# Patient Record
Sex: FEMALE | Race: WHITE | NOT HISPANIC OR LATINO
[De-identification: names, ages, dates, MRNs, and addresses within clinical notes are randomized per-mention and may not be internally consistent; named-entity substitution may affect disease eponyms.]

---

## 2017-01-23 ENCOUNTER — APPOINTMENT (OUTPATIENT)
Dept: GASTROENTEROLOGY | Facility: CLINIC | Age: 77
End: 2017-01-23

## 2017-03-02 ENCOUNTER — APPOINTMENT (OUTPATIENT)
Dept: GASTROENTEROLOGY | Facility: CLINIC | Age: 77
End: 2017-03-02

## 2017-03-02 VITALS
BODY MASS INDEX: 22.53 KG/M2 | OXYGEN SATURATION: 97 % | DIASTOLIC BLOOD PRESSURE: 60 MMHG | HEIGHT: 64 IN | RESPIRATION RATE: 14 BRPM | SYSTOLIC BLOOD PRESSURE: 120 MMHG | TEMPERATURE: 97.7 F | HEART RATE: 68 BPM | WEIGHT: 132 LBS

## 2017-03-02 DIAGNOSIS — E78.00 PURE HYPERCHOLESTEROLEMIA, UNSPECIFIED: ICD-10-CM

## 2017-03-02 DIAGNOSIS — Z83.79 FAMILY HISTORY OF OTHER DISEASES OF THE DIGESTIVE SYSTEM: ICD-10-CM

## 2017-03-02 DIAGNOSIS — K62.5 HEMORRHAGE OF ANUS AND RECTUM: ICD-10-CM

## 2017-03-03 ENCOUNTER — OTHER (OUTPATIENT)
Age: 77
End: 2017-03-03

## 2017-03-05 ENCOUNTER — FORM ENCOUNTER (OUTPATIENT)
Age: 77
End: 2017-03-05

## 2017-03-06 ENCOUNTER — APPOINTMENT (OUTPATIENT)
Dept: CT IMAGING | Facility: IMAGING CENTER | Age: 77
End: 2017-03-06

## 2017-03-06 ENCOUNTER — OUTPATIENT (OUTPATIENT)
Dept: OUTPATIENT SERVICES | Facility: HOSPITAL | Age: 77
LOS: 1 days | End: 2017-03-06
Payer: MEDICARE

## 2017-03-06 DIAGNOSIS — R10.32 LEFT LOWER QUADRANT PAIN: ICD-10-CM

## 2017-03-06 PROCEDURE — 82565 ASSAY OF CREATININE: CPT

## 2017-03-06 PROCEDURE — 74177 CT ABD & PELVIS W/CONTRAST: CPT

## 2017-03-29 ENCOUNTER — RESULT REVIEW (OUTPATIENT)
Age: 77
End: 2017-03-29

## 2017-05-02 ENCOUNTER — MESSAGE (OUTPATIENT)
Age: 77
End: 2017-05-02

## 2017-05-08 ENCOUNTER — APPOINTMENT (OUTPATIENT)
Dept: GASTROENTEROLOGY | Facility: CLINIC | Age: 77
End: 2017-05-08

## 2017-05-15 ENCOUNTER — APPOINTMENT (OUTPATIENT)
Dept: GASTROENTEROLOGY | Facility: CLINIC | Age: 77
End: 2017-05-15

## 2017-05-15 VITALS
DIASTOLIC BLOOD PRESSURE: 70 MMHG | OXYGEN SATURATION: 95 % | HEART RATE: 70 BPM | SYSTOLIC BLOOD PRESSURE: 110 MMHG | TEMPERATURE: 98 F | HEIGHT: 64 IN | BODY MASS INDEX: 22.71 KG/M2 | RESPIRATION RATE: 14 BRPM | WEIGHT: 133 LBS

## 2017-06-23 ENCOUNTER — APPOINTMENT (OUTPATIENT)
Dept: GASTROENTEROLOGY | Facility: CLINIC | Age: 77
End: 2017-06-23

## 2017-07-03 ENCOUNTER — APPOINTMENT (OUTPATIENT)
Dept: ORTHOPEDIC SURGERY | Facility: CLINIC | Age: 77
End: 2017-07-03

## 2017-07-03 VITALS
SYSTOLIC BLOOD PRESSURE: 110 MMHG | HEIGHT: 64 IN | HEART RATE: 72 BPM | BODY MASS INDEX: 22.71 KG/M2 | DIASTOLIC BLOOD PRESSURE: 80 MMHG | WEIGHT: 133 LBS

## 2017-07-03 DIAGNOSIS — M25.572 PAIN IN RIGHT ANKLE AND JOINTS OF RIGHT FOOT: ICD-10-CM

## 2017-07-03 DIAGNOSIS — M25.571 PAIN IN RIGHT ANKLE AND JOINTS OF RIGHT FOOT: ICD-10-CM

## 2017-07-18 ENCOUNTER — APPOINTMENT (OUTPATIENT)
Dept: MRI IMAGING | Facility: IMAGING CENTER | Age: 77
End: 2017-07-18

## 2017-07-18 ENCOUNTER — OUTPATIENT (OUTPATIENT)
Dept: OUTPATIENT SERVICES | Facility: HOSPITAL | Age: 77
LOS: 1 days | End: 2017-07-18
Payer: MEDICARE

## 2017-07-18 DIAGNOSIS — M25.572 PAIN IN LEFT ANKLE AND JOINTS OF LEFT FOOT: ICD-10-CM

## 2017-07-18 PROCEDURE — 73721 MRI JNT OF LWR EXTRE W/O DYE: CPT

## 2017-08-03 ENCOUNTER — FORM ENCOUNTER (OUTPATIENT)
Age: 77
End: 2017-08-03

## 2017-08-04 ENCOUNTER — APPOINTMENT (OUTPATIENT)
Dept: NUCLEAR MEDICINE | Facility: HOSPITAL | Age: 77
End: 2017-08-04
Payer: MEDICARE

## 2017-08-04 ENCOUNTER — OUTPATIENT (OUTPATIENT)
Dept: OUTPATIENT SERVICES | Facility: HOSPITAL | Age: 77
LOS: 1 days | End: 2017-08-04

## 2017-08-04 DIAGNOSIS — K21.9 GASTRO-ESOPHAGEAL REFLUX DISEASE WITHOUT ESOPHAGITIS: ICD-10-CM

## 2017-08-04 PROCEDURE — 78264 GASTRIC EMPTYING IMG STUDY: CPT | Mod: 26

## 2017-08-07 ENCOUNTER — APPOINTMENT (OUTPATIENT)
Dept: ORTHOPEDIC SURGERY | Facility: CLINIC | Age: 77
End: 2017-08-07

## 2017-09-18 ENCOUNTER — APPOINTMENT (OUTPATIENT)
Dept: ORTHOPEDIC SURGERY | Facility: CLINIC | Age: 77
End: 2017-09-18

## 2017-11-13 ENCOUNTER — APPOINTMENT (OUTPATIENT)
Dept: ORTHOPEDIC SURGERY | Facility: CLINIC | Age: 77
End: 2017-11-13
Payer: MEDICARE

## 2017-11-13 DIAGNOSIS — M21.6X2 OTHER ACQUIRED DEFORMITIES OF LEFT FOOT: ICD-10-CM

## 2017-11-13 DIAGNOSIS — M24.9 JOINT DERANGEMENT, UNSPECIFIED: ICD-10-CM

## 2017-11-13 DIAGNOSIS — M95.8 OTHER SPECIFIED ACQUIRED DEFORMITIES OF MUSCULOSKELETAL SYSTEM: ICD-10-CM

## 2017-11-13 DIAGNOSIS — M94.279 CHONDROMALACIA, UNSPECIFIED ANKLE AND JOINTS OF FOOT: ICD-10-CM

## 2017-11-13 DIAGNOSIS — M21.42 FLAT FOOT [PES PLANUS] (ACQUIRED), LEFT FOOT: ICD-10-CM

## 2017-11-13 DIAGNOSIS — M21.6X1 OTHER ACQUIRED DEFORMITIES OF RIGHT FOOT: ICD-10-CM

## 2017-11-13 PROCEDURE — 99214 OFFICE O/P EST MOD 30 MIN: CPT

## 2017-11-13 RX ORDER — CEPHALEXIN 500 MG/1
500 CAPSULE ORAL
Qty: 30 | Refills: 0 | Status: COMPLETED | COMMUNITY
Start: 2017-02-21

## 2017-11-13 RX ORDER — AMOXICILLIN AND CLAVULANATE POTASSIUM 875; 125 MG/1; MG/1
875-125 TABLET, COATED ORAL
Qty: 14 | Refills: 0 | Status: COMPLETED | COMMUNITY
Start: 2017-01-09

## 2017-11-13 RX ORDER — AMOXICILLIN 500 MG/1
500 CAPSULE ORAL
Qty: 21 | Refills: 0 | Status: COMPLETED | COMMUNITY
Start: 2017-01-31

## 2018-02-14 ENCOUNTER — MESSAGE (OUTPATIENT)
Age: 78
End: 2018-02-14

## 2018-02-26 ENCOUNTER — APPOINTMENT (OUTPATIENT)
Dept: GASTROENTEROLOGY | Facility: CLINIC | Age: 78
End: 2018-02-26
Payer: MEDICARE

## 2018-02-26 VITALS
TEMPERATURE: 97.7 F | BODY MASS INDEX: 22.36 KG/M2 | OXYGEN SATURATION: 63 % | HEIGHT: 64 IN | DIASTOLIC BLOOD PRESSURE: 80 MMHG | SYSTOLIC BLOOD PRESSURE: 130 MMHG | WEIGHT: 131 LBS | RESPIRATION RATE: 14 BRPM | HEART RATE: 99 BPM

## 2018-02-26 DIAGNOSIS — R10.32 LEFT LOWER QUADRANT PAIN: ICD-10-CM

## 2018-02-26 PROCEDURE — 99214 OFFICE O/P EST MOD 30 MIN: CPT

## 2018-02-27 ENCOUNTER — MESSAGE (OUTPATIENT)
Age: 78
End: 2018-02-27

## 2018-03-12 ENCOUNTER — APPOINTMENT (OUTPATIENT)
Dept: GASTROENTEROLOGY | Facility: CLINIC | Age: 78
End: 2018-03-12
Payer: MEDICARE

## 2018-03-12 PROCEDURE — 91065 BREATH HYDROGEN/METHANE TEST: CPT

## 2018-03-14 ENCOUNTER — MESSAGE (OUTPATIENT)
Age: 78
End: 2018-03-14

## 2018-03-21 ENCOUNTER — APPOINTMENT (OUTPATIENT)
Dept: GASTROENTEROLOGY | Facility: HOSPITAL | Age: 78
End: 2018-03-21

## 2019-03-04 ENCOUNTER — APPOINTMENT (OUTPATIENT)
Dept: GASTROENTEROLOGY | Facility: CLINIC | Age: 79
End: 2019-03-04

## 2019-07-07 PROBLEM — M21.42 ACQUIRED LEFT FLAT FOOT: Status: ACTIVE | Noted: 2017-07-08

## 2020-04-27 ENCOUNTER — APPOINTMENT (OUTPATIENT)
Dept: UROGYNECOLOGY | Facility: CLINIC | Age: 80
End: 2020-04-27

## 2020-05-04 ENCOUNTER — APPOINTMENT (OUTPATIENT)
Dept: GASTROENTEROLOGY | Facility: CLINIC | Age: 80
End: 2020-05-04
Payer: MEDICARE

## 2020-05-04 DIAGNOSIS — Z87.19 PERSONAL HISTORY OF OTHER DISEASES OF THE DIGESTIVE SYSTEM: ICD-10-CM

## 2020-05-04 PROCEDURE — 99214 OFFICE O/P EST MOD 30 MIN: CPT | Mod: 95

## 2020-05-07 RX ORDER — SIMVASTATIN 40 MG/1
40 TABLET, FILM COATED ORAL
Qty: 90 | Refills: 0 | Status: ACTIVE | COMMUNITY
Start: 2020-02-23

## 2020-05-07 RX ORDER — OMEPRAZOLE 20 MG/1
20 CAPSULE, DELAYED RELEASE ORAL DAILY
Qty: 30 | Refills: 5 | Status: DISCONTINUED | COMMUNITY
Start: 2017-03-02 | End: 2020-05-07

## 2020-05-07 NOTE — PHYSICAL EXAM
[General Appearance - Alert] : alert [PERRL With Normal Accommodation] : pupils were equal in size, round, and reactive to light [General Appearance - In No Acute Distress] : in no acute distress [Sclera] : the sclera and conjunctiva were normal [Oropharynx] : the oropharynx was normal [Extraocular Movements] : extraocular movements were intact [Outer Ear] : the ears and nose were normal in appearance [Jugular Venous Distention Increased] : there was no jugular-venous distention [Neck Appearance] : the appearance of the neck was normal [Neck Cervical Mass (___cm)] : no neck mass was observed [Thyroid Diffuse Enlargement] : the thyroid was not enlarged [Thyroid Nodule] : there were no palpable thyroid nodules [Auscultation Breath Sounds / Voice Sounds] : lungs were clear to auscultation bilaterally [Heart Rate And Rhythm] : heart rate was normal and rhythm regular [Heart Sounds Gallop] : no gallops [Heart Sounds] : normal S1 and S2 [Murmurs] : no murmurs [Heart Sounds Pericardial Friction Rub] : no pericardial rub [Bowel Sounds] : normal bowel sounds [Edema] : there was no peripheral edema [Full Pulse] : the pedal pulses are present [Abdomen Soft] : soft [Abdomen Mass (___ Cm)] : no abdominal mass palpated [Abdomen Hernia] : no hernia was discovered [Epigastric] : in the epigastric area [LUQ] : in the left upper quadrant [No Rectal Mass] : no rectal mass [Internal Hemorrhoid] : internal hemorrhoids [External Hemorrhoid] : external hemorrhoids [Supraclavicular Lymph Nodes Enlarged Bilaterally] : supraclavicular [Cervical Lymph Nodes Enlarged Posterior Bilaterally] : posterior cervical [Cervical Lymph Nodes Enlarged Anterior Bilaterally] : anterior cervical [Axillary Lymph Nodes Enlarged Bilaterally] : axillary [Inguinal Lymph Nodes Enlarged Bilaterally] : inguinal [Femoral Lymph Nodes Enlarged Bilaterally] : femoral [No CVA Tenderness] : no ~M costovertebral angle tenderness [No Spinal Tenderness] : no spinal tenderness [Nail Clubbing] : no clubbing  or cyanosis of the fingernails [Abnormal Walk] : normal gait [Musculoskeletal - Swelling] : no joint swelling seen [Motor Tone] : muscle strength and tone were normal [Skin Color & Pigmentation] : normal skin color and pigmentation [Deep Tendon Reflexes (DTR)] : deep tendon reflexes were 2+ and symmetric [Skin Turgor] : normal skin turgor [] : no rash [No Focal Deficits] : no focal deficits [Oriented To Time, Place, And Person] : oriented to person, place, and time [Sensation] : the sensory exam was normal to light touch and pinprick [Affect] : the affect was normal [Impaired Insight] : insight and judgment were intact [FreeTextEntry1] : noted as HPI

## 2020-05-07 NOTE — REVIEW OF SYSTEMS
[As Noted in HPI] : as noted in HPI [Abdominal Pain] : abdominal pain [Constipation] : constipation [Negative] : Endocrine [Fever] : no fever [Chills] : no chills [Feeling Poorly] : not feeling poorly [Feeling Tired] : not feeling tired [Recent Weight Gain (___ Lbs)] : no recent weight gain [Recent Weight Loss (___ Lbs)] : no recent weight loss

## 2020-05-07 NOTE — ASSESSMENT
[FreeTextEntry1] : Assessment\par 1) severe acid reflux symptoms without significant esophagitis with a medium sized hiatal hernia, however, the patient has taken NSAIDs and prednisone in the recent past which can cause significant NSAID gastropathy in addition to her GERD symptoms\par 2) chronic constipation with abdominal pain likely from irritable bowel syndrome, doing better taking psyllium\par 3) diverticulosis\par 4) internal hemorrhoids\par Plan\par 1) I reviewed the results of her her past upper endoscopy and colonoscopy with patient from 2016 and her negative breath test for SIBO\par 2) restart omeprazole 40 mg po bid ac and famotidine 40 mg hs and prn if heartburn occurs during the day \par 3) continue therapy of constipation using psyllium husk that is more coarse explained to the patient also as part of hemorrhoidal care and reduce colonic pressures which is helpful for diverticulosis\par 3)  hemorrhoidal care using aloe wipes and Tucks pads.\par 5) office follow-up as needed; patient will call to say how she is doing on the above regimen

## 2020-05-07 NOTE — HISTORY OF PRESENT ILLNESS
[Other Location: e.g. Home (Enter Location, City,State)___] : at [unfilled] [Home] : at home, [unfilled] , at the time of the visit. [Patient] : the patient [Self] : self [___ Month(s) Ago] : [unfilled] month(s) ago [Ordering Test(s) ___] : ordering [unfilled] [Heartburn] : improved heartburn [Nausea] : denies nausea [Vomiting] : denies vomiting [Diarrhea] : denies diarrhea [Yellow Skin Or Eyes (Jaundice)] : denies jaundice [Constipation] : improvement in constipation [Rectal Pain] : denies rectal pain [Wt Gain ___ Lbs] : recent [unfilled] ~Upound(s) weight gain [Wt Loss ___ Lbs] : recent [unfilled] ~Upound(s) weight loss [Abdominal Swelling] : abdominal swelling [GERD] : gastroesophageal reflux disease [Abdominal Pain] : abdominal pain [_________] : Performed [unfilled] [de-identified] : This visit was performed via Telehealth realtime 2-way audiovisual technology and lasted  25 minutes. \par \par Chapis Mary, an 79 year old female presents for a follow up visit today via Telehealth for follow-up evaluation of recurrence of heartburn and epigastric pain and chronic constipation.  She has run out of omeprazole and had been taking Meloxicam and prednisone for arthritis.  \par The lower abdominal pain is much better.  She had been taking ranitidine at bedtime in the past.  I explained that there is a problem with this medication which is partly made in [place] and it can no longer be used.  She denies nausea, vomiting, hematochezia, or melena.  \par \par She has chronic constipation and dyschezia for which she is taking Metamucil and has daily bowel movements.  She has multiple movements in the morning.   She tried Miralax, but did not like the loose stools, therefore had stopped using it.   The patient had a colonoscopy and EGD in 2016.  The colonoscopy revealed diverticula in sigmoid colon.  The EGD in  revealed negative for H..pylori, and squamous esophageal mucosa within normal limits at the GE junction.\par \par There is a history of chronic constipation in the family.  Her granddaughter who is nine has bowel movements twice weekly.  The patient had had a colonoscopy and EGD  in  . The colonoscopy revealed diverticulosis. \par \par    She reports a strong family history of cancer: her nephew, her brothers son, had been diagnosed with colorectal cancer requiring a colostomy at age 44 and recently ;  her maternal grandfather was diagnosed with a fatal colon cancer at age 70;  her mother has survived ovarian cancer  and has ulcerative colitis, her paternal grandmother  of  uterine cancer.  \par  [de-identified] : diverticulosis in sigmoid colon, internal hemorroid

## 2020-05-07 NOTE — CONSULT LETTER
[Dear  ___] : Dear  [unfilled], [Consult Letter:] : I had the pleasure of evaluating your patient, [unfilled]. [Sincerely,] : Sincerely, [Please see my note below.] : Please see my note below. [Salvatore Julian MD, FACP, FACG, FASNICKI, AGAF] : Salvatore Julian MD, FACP, FACG, SIMONA, AGAF [Associate Professor of Medicine] : Associate Professor of Medicine [[facility] [employer] [facility]] : [facility] [FreeTextEntry2] : Lloyd Mars MD

## 2020-07-02 ENCOUNTER — APPOINTMENT (OUTPATIENT)
Dept: GASTROENTEROLOGY | Facility: CLINIC | Age: 80
End: 2020-07-02
Payer: MEDICARE

## 2020-07-02 PROCEDURE — 99443: CPT

## 2020-07-13 ENCOUNTER — APPOINTMENT (OUTPATIENT)
Dept: GASTROENTEROLOGY | Facility: CLINIC | Age: 80
End: 2020-07-13
Payer: MEDICARE

## 2020-07-13 VITALS — BODY MASS INDEX: 22.49 KG/M2 | WEIGHT: 131 LBS

## 2020-07-13 DIAGNOSIS — K64.8 OTHER HEMORRHOIDS: ICD-10-CM

## 2020-07-13 PROCEDURE — 99443: CPT

## 2020-08-02 PROBLEM — K64.8 INTERNAL HEMORRHOIDS WITH COMPLICATION: Status: ACTIVE | Noted: 2017-05-19

## 2020-08-02 NOTE — HISTORY OF PRESENT ILLNESS
[Home] : at home, [unfilled] , at the time of the visit. [Other Location: e.g. Home (Enter Location, City,State)___] : at [unfilled] [Verbal consent obtained from patient] : the patient, [unfilled] [de-identified] : This visit was performed via Telehealth realtime 2-way audio technology and lasted 25 minutes. \par \par Chapis Mary, an 79 year old female presents for a follow up visit today via Telehealth for follow-up evaluation of recurrence of heartburn and epigastric pain and chronic constipation. She had run out of omeprazole and had been taking Meloxicam and prednisone for arthritis.  The heartburn had become very severe off PPI.   Her main problem today is severe constipation and anal pain.  She denies nausea, vomiting, hematochezia, or melena. \par She has had chronic constipation and dyschezia for which she is taking Metamucil and had daily bowel movements. She then began taking it intermittently.  She tried Miralax, but did not like the loose stools, therefore had stopped using it. The patient had a colonoscopy and EGD in 2016. The colonoscopy revealed diverticula in sigmoid colon. The EGD in  revealed negative for H..pylori, and squamous esophageal mucosa within normal limits at the GE junction.  She had repeat colonoscopy and EGD by Dr. Long who found similar findings and said that he does not treat patients, only does procedures and sent her away.\par \par There is a history of chronic constipation in the family. Her granddaughter who is nine has bowel movements twice weekly. The patient had had a colonoscopy and EGD in . The colonoscopy revealed diverticulosis. \par \par She reports a strong family history of cancer: her nephew, her brothers son, had been diagnosed with colorectal cancer requiring a colostomy at age 44 and recently ; her maternal grandfather was diagnosed with a fatal colon cancer at age 70; her mother has survived ovarian cancer and has ulcerative colitis, her paternal grandmother  of uterine cancer. \par  \par

## 2020-08-02 NOTE — ASSESSMENT
[FreeTextEntry1] : Assessment\par 1) severe acid reflux symptoms without significant esophagitis with a medium sized hiatal hernia, \par 2) chronic constipation with abdominal pain likely from irritable bowel syndrome, doing better taking psyllium\par 3) diverticulosis\par 4) internal hemorrhoids\par Plan\par 1) I reviewed the results of her her past upper endoscopy and colonoscopy with patient from 2016 and her negative breath test for SIBO\par 2) continue omeprazole 40 mg po bid ac and famotidine 40 mg hs and prn if heartburn occurs during the day \par 3) continue therapy of constipation using psyllium husk that is more coarse explained to the patient also as part of hemorrhoidal care and reduce colonic pressures which is helpful for diverticulosis\par 4) hemorrhoidal care using aloe wipes and Tucks pads.\par 5) For diverticulosis the patient is instructed in a high fiber diet and increased fluid intake to reduce constipation.  Encouraged to consume popcorn and to disregard any warnings about seeds or nuts which are unfounded.  \par 5) office follow-up as needed; patient will call to say how she is doing on the above regimen. \par

## 2020-08-02 NOTE — HISTORY OF PRESENT ILLNESS
[Home] : at home, [unfilled] , at the time of the visit. [Other Location: e.g. Home (Enter Location, City,State)___] : at [unfilled] [Verbal consent obtained from patient] : the patient, [unfilled] [de-identified] : This visit was performed via Telehealth realtime 2-way audio technology and lasted  33 minutes. \par \par Chapis Mary, an 79 year old female presents for a follow up visit today via Telehealth for follow-up evaluation of recurrence of heartburn and epigastric pain and chronic constipation.  Her constipation is  somewhat better now that she is taking psyllium husk consistently and using milk of magnesia prn.  She has run out of omeprazole and had been taking Meloxicam and prednisone for arthritis. \par \par The lower abdominal pain is much better. She denies nausea, vomiting, hematochezia, or melena. \par She has chronic constipation and dyschezia for which she is taking Metamucil and has daily bowel movements. The patient had a colonoscopy and EGD in 2016. The colonoscopy revealed diverticula in sigmoid colon. The EGD in  revealed negative for H..pylori, and squamous esophageal mucosa within normal limits at the GE junction.  She had repeat colonoscopy and EGD by Dr. Long who found similar findings and said that he does not treat patients, only does procedures and sent her away.  I am still awaiting the reports from these procedures but am aware of the results from the patient.\par \par There is a history of chronic constipation in the family. Her granddaughter who is nine has bowel movements twice weekly. The patient had had a colonoscopy and EGD in . The colonoscopy revealed diverticulosis. \par \par She reports a strong family history of cancer: her nephew, her brothers son, had been diagnosed with colorectal cancer requiring a colostomy at age 44 and recently ; her maternal grandfather was diagnosed with a fatal colon cancer at age 70; her mother has survived ovarian cancer and has ulcerative colitis, her paternal grandmother  of uterine cancer. \par

## 2020-08-04 ENCOUNTER — APPOINTMENT (OUTPATIENT)
Dept: GASTROENTEROLOGY | Facility: CLINIC | Age: 80
End: 2020-08-04
Payer: MEDICARE

## 2020-08-04 VITALS — HEIGHT: 64 IN | BODY MASS INDEX: 21.34 KG/M2 | WEIGHT: 125 LBS

## 2020-08-04 DIAGNOSIS — K57.30 DIVERTICULOSIS OF LARGE INTESTINE W/OUT PERFORATION OR ABSCESS W/OUT BLEEDING: ICD-10-CM

## 2020-08-04 DIAGNOSIS — K58.2 MIXED IRRITABLE BOWEL SYNDROME: ICD-10-CM

## 2020-08-04 PROCEDURE — 99443: CPT

## 2020-08-04 RX ORDER — MELOXICAM 15 MG/1
15 TABLET ORAL
Qty: 30 | Refills: 0 | Status: DISCONTINUED | COMMUNITY
Start: 2020-01-18 | End: 2020-08-04

## 2020-08-04 NOTE — HISTORY OF PRESENT ILLNESS
[Other Location: e.g. Home (Enter Location, City,State)___] : at [unfilled] [Home] : at home, [unfilled] , at the time of the visit. [Verbal consent obtained from patient] : the patient, [unfilled] [de-identified] : \par \par This visit was performed via Telehealth realtime 2-way audio technology and lasted 33 minutes. \par \par Chapis Mary, an 79 year old female presents for a follow up visit today via Telehealth for follow-up evaluation of recurrence of heartburn and epigastric pain and chronic constipation.  Her constipation is somewhat better now that she is taking psyllium husk consistently and using milk of magnesia prn.   She does notes abdominal cramping at times and loose stools can persiste for a day.  \par \par She has run out of omeprazole and had been taking meloxicam and prednisone for arthritis.   She has stopped the meloxicam. \par \par She denies nausea, vomiting, hematochezia, or melena. \par She has chronic constipation and dyschezia for which she is taking Metamucil and has daily bowel movements. The patient had a colonoscopy and EGD in 2016. The colonoscopy revealed diverticula in sigmoid colon. The EGD in  revealed negative for H..pylori, and squamous esophageal mucosa within normal limits at the GE junction. She had repeat colonoscopy and EGD by Dr. Long who found similar findings and said that he does not treat patients, only does procedures and sent her away.  The reports reveal diverticulosis and mild GERD.  A gastric emptying study was negative in  as was an abdominal CAT scan.  A breath test was negative in 2018.\par \par There is a history of chronic constipation in the family. Her granddaughter who is nine has bowel movements twice weekly. The patient had had a colonoscopy and EGD in . The colonoscopy revealed diverticulosis. \par \par She reports a strong family history of cancer: her nephew, her brothers son, had been diagnosed with colorectal cancer requiring a colostomy at age 44 and recently ; her maternal grandfather was diagnosed with a fatal colon cancer at age 70; her mother has survived ovarian cancer and has ulcerative colitis, her paternal grandmother  of uterine cancer. \par

## 2020-08-04 NOTE — ASSESSMENT
[FreeTextEntry1] : Assessment\par 1) severe acid reflux symptoms without significant esophagitis with a medium sized hiatal hernia, \par 2) chronic constipation with abdominal pain likely from irritable bowel syndrome, doing better taking psyllium\par 3) diverticulosis\par 4) internal hemorrhoids\par Plan\par 1) I reviewed the results of her her past upper endoscopy and colonoscopy with patient from 2016 and 2018 and her negative breath test for SIBO and normal gastric emptying study\par 2) continue omeprazole 40 mg po bid ac and famotidine 40 mg hs and prn if heartburn occurs during the day \par 3) continue therapy of constipation using psyllium husk that is more coarse explained to the patient also as part of hemorrhoidal care and reduce colonic pressures which is helpful for diverticulosis\par 4) hemorrhoidal care using aloe wipes and Tucks pads.\par 5) For diverticulosis the patient is instructed in a high fiber diet and increased fluid intake to reduce constipation. Encouraged to consume popcorn and to disregard any warnings about seeds or nuts which are unfounded. \par 5) BRAVO testing for ongoing pyrosis and follow-up with Dr. Purdy. \par

## 2020-10-30 DIAGNOSIS — Z01.812 ENCOUNTER FOR PREPROCEDURAL LABORATORY EXAMINATION: ICD-10-CM

## 2020-11-06 ENCOUNTER — APPOINTMENT (OUTPATIENT)
Dept: PULMONOLOGY | Facility: CLINIC | Age: 80
End: 2020-11-06
Payer: MEDICARE

## 2020-11-06 VITALS
HEIGHT: 63 IN | SYSTOLIC BLOOD PRESSURE: 120 MMHG | HEART RATE: 76 BPM | DIASTOLIC BLOOD PRESSURE: 70 MMHG | WEIGHT: 127 LBS | OXYGEN SATURATION: 97 % | BODY MASS INDEX: 22.5 KG/M2 | TEMPERATURE: 98.1 F | RESPIRATION RATE: 16 BRPM

## 2020-11-06 DIAGNOSIS — M94.20 CHONDROMALACIA, UNSPECIFIED SITE: ICD-10-CM

## 2020-11-06 DIAGNOSIS — Z84.89 FAMILY HISTORY OF OTHER SPECIFIED CONDITIONS: ICD-10-CM

## 2020-11-06 DIAGNOSIS — Z80.3 FAMILY HISTORY OF MALIGNANT NEOPLASM OF BREAST: ICD-10-CM

## 2020-11-06 DIAGNOSIS — Z87.19 PERSONAL HISTORY OF OTHER DISEASES OF THE DIGESTIVE SYSTEM: ICD-10-CM

## 2020-11-06 DIAGNOSIS — Z83.79 FAMILY HISTORY OF OTHER DISEASES OF THE DIGESTIVE SYSTEM: ICD-10-CM

## 2020-11-06 DIAGNOSIS — E78.89 OTHER LIPOPROTEIN METABOLISM DISORDERS: ICD-10-CM

## 2020-11-06 DIAGNOSIS — Z87.39 PERSONAL HISTORY OF OTHER DISEASES OF THE MUSCULOSKELETAL SYSTEM AND CONNECTIVE TISSUE: ICD-10-CM

## 2020-11-06 DIAGNOSIS — N30.90 CYSTITIS, UNSPECIFIED W/OUT HEMATURIA: ICD-10-CM

## 2020-11-06 DIAGNOSIS — Z82.3 FAMILY HISTORY OF STROKE: ICD-10-CM

## 2020-11-06 DIAGNOSIS — Z80.42 FAMILY HISTORY OF MALIGNANT NEOPLASM OF PROSTATE: ICD-10-CM

## 2020-11-06 DIAGNOSIS — Z86.79 PERSONAL HISTORY OF OTHER DISEASES OF THE CIRCULATORY SYSTEM: ICD-10-CM

## 2020-11-06 DIAGNOSIS — Z86.39 PERSONAL HISTORY OF OTHER ENDOCRINE, NUTRITIONAL AND METABOLIC DISEASE: ICD-10-CM

## 2020-11-06 DIAGNOSIS — Z63.4 DISAPPEARANCE AND DEATH OF FAMILY MEMBER: ICD-10-CM

## 2020-11-06 PROCEDURE — 94010 BREATHING CAPACITY TEST: CPT

## 2020-11-06 PROCEDURE — 99204 OFFICE O/P NEW MOD 45 MIN: CPT | Mod: 25

## 2020-11-06 PROCEDURE — 99072 ADDL SUPL MATRL&STAF TM PHE: CPT

## 2020-11-06 PROCEDURE — 71046 X-RAY EXAM CHEST 2 VIEWS: CPT

## 2020-11-06 PROCEDURE — 94729 DIFFUSING CAPACITY: CPT

## 2020-11-06 PROCEDURE — 94618 PULMONARY STRESS TESTING: CPT

## 2020-11-06 PROCEDURE — 94727 GAS DIL/WSHOT DETER LNG VOL: CPT

## 2020-11-06 PROCEDURE — ZZZZZ: CPT

## 2020-11-06 PROCEDURE — 95012 NITRIC OXIDE EXP GAS DETER: CPT

## 2020-11-06 RX ORDER — PREDNISONE 20 MG/1
20 TABLET ORAL
Qty: 10 | Refills: 0 | Status: DISCONTINUED | COMMUNITY
Start: 2020-03-10 | End: 2020-11-06

## 2020-11-06 SDOH — SOCIAL STABILITY - SOCIAL INSECURITY: DISSAPEARANCE AND DEATH OF FAMILY MEMBER: Z63.4

## 2020-11-06 NOTE — PROCEDURE
[FreeTextEntry1] : Chest CT (10.15.2020) reveals no significant findings in the chest. Mild to moderate S-shaped thoracic scoliosis incidentally noted. Benign- appearing liver cyst incidentally noted and not requiring further workup. \par \par CXR revealed a normal sized heart; Areas of chronic changes at the bases more on the right middle lobe and lingular; Calcified cartilage. \par \par Last cardiac visit () reveals EF of 60% and normal nuclear stress test. Test completed on (07.15.2019) \par \par Full PFT reveals mild-low volume; FEV1 was 1.59L which is 89% of predicted; normal lung volumes; normal diffusion at 15.5, which is 94% of predicted; normal flow volume loop. \par \par FENO was 34; a normal value being less than 25\par Fractional exhaled nitric oxide (FENO) is regarded as a simple, noninvasive method for assessing eosinophilic airway inflammation. Produced by a variety of cells within the lung, nitric oxide (NO) concentrations are generally low in healthy individuals. However, high concentrations of NO appear to be involved in nonspecific host defense mechanisms and chronic inflammatory diseases such as asthma. The American Thoracic Society (ATS) therefore has recommended using FENO to aid in the diagnosis and monitoring of eosinophilic airway inflammation and asthma, and for identifying steroid responsive individuals whose chronic respiratory symptoms may be caused by airway inflammation. \par \par 6 minute walk test reveals a low saturation of 95% with no evidence of dyspnea or fatigue; walked 293.4 meters

## 2020-11-06 NOTE — ASSESSMENT
[FreeTextEntry1] : Ms. EZEQUIEL VERNON is a 80 year old female who has a history of Chondromalacia, diverticulosis, GERD, Elevated Cholesterol, IBS, osteoporosis, aortic stenosis, Non-smoker who now comes in for pulmonary evaluation for SOB, Chronic cough, GERD, and Post Nasal Drip Syndrome. \par \par The patient's SOB is felt to be multifactorial:\par -pulmonary \par    -Mild Asthma \par - Poor breathing mechanics (Poor balance) \par - Cardiac Disease - ?progression of Aortic Stenosis. \par -?Anemia \par \par \par Problem 1: Cardiac Disease \par -?Progression of Aortic Stenosis. \par -Recommended to f/u with the Cardiologist . \par -Recommended cardiologist for Aortic valve  \par -Complete Blood Test: BNP. \par \par Problem 2: Mild Asthma \par -Full PFTs to follow \par -Add Generic Advair (250) 1 inhalation BID \par \par -Inhaler technique reviewed as well as oral hygiene techniques reviewed with patient. Avoidance of cold air, extremes of temperature, rescue inhaler should be used before exercise. Order of medication reviewed with patient. Recommended use of a cool mist humidifier in the bedroom. \par \par -Asthma is believed to be caused by inherited (genetic) and environmental factor, but its exact cause is unknown. Asthma may be triggered by allergens, lung infections, or irritants in the air. Asthma triggers are different for each person \par \par Problem 3: Chronic Cough \par The Cough is felt to be multifactorial\par -asthma \par -allergies and PND \par -GERD\par -Congestive Heart Failure/ Aortic Stenosis\par \par -Any cough greater than three weeks duration-differential diagnosis includes-asthma, upper airway cough syndrome, post nasal drip syndrome, gastroesophageal reflux, laryngopharyngeal reflux, cardiac disease (congestive heart failure, medicines, effects, etc), medication effects (b-blockers, ace inhibitors, ARBs, glaucoma meds, etc.), smoking, infectious, multifactorial, etc. \par \par Problem 4:allergies and PND \par -Add Olopatadine 0.6%1 sniff/nostril BID \par -Complete blood work for asthma profile, food IgE panel, eosinophil level, IgE level, Vitamin D level \par \par -Environmental measures for allergies were encouraged including mattress and pillow cover, air purifier, and environmental controls. \par \par Problem 5: GERD\par -Add Omeprazole (40mg) every morning (qAM) before breakfast \par -Add Pepcid 40mg QHS \par \par - Things to avoid including overeating, spicy foods, tight clothing, eating within three hours of bed, this list is not all inclusive.\par \par - For treatment of reflux, possible options discussed including diet control, H2 blockers, PPIs, as well as coating motility agents discussed as treatment options. Timing of meals and proximity of last meal to sleep were discussed. If symptoms persist, a formal gastrointestinal evaluation is needed. \par \par Problem 6: Hemoptysis\par -related to Congestive Heart Failure \par -less endobronchial abnormalities \par -quantify the amount \par -Recommended to f/u with Cardiologist \par -Check BNP. \par -CT was normal.\par \par Problem 7: ?[place]\par -Complete Home Sleep Study. \par -Sleep apnea is associated with adverse clinical consequences which an affect most organ systems. Cardiovascular disease risk includes arrhythmias, atrial fibrillation, hypertension, coronary artery disease, and stroke. Metabolic disorders include diabetes type 2, non-alcoholic fatty liver disease. Mood disorder especially depression; and cognitive decline especially in the elderly. Associations with chronic reflux/Mcnair’s esophagus some but not all inclusive. \par -Reasons include arousal consistent with hypopnea; respiratory events most prominent in REM sleep or supine position; therefore sleep staging and body position are important for accurate diagnosis and estimation of AHI. \par \par Problem 8: ?Anemia \par -Complete Blood Tests: CBC and iron studies. \par \par Problem 9: Poor Breathing Mechanics\par - Proper breathing techniques were reviewed with an emphasis of exhalation. Patient instructed to breath in for 1 second and out for four seconds. Patient was encouraged to not talk while walking.\par \par Problem 10: Health maintenance\par -s/p flu shot\par -recommended strep pneumonia vaccines: Prevnar-13 vaccine, followed by Pneumo vaccine 23 one year following (after the age of 65)\par -recommended early intervention for URIs\par -recommended regular osteoporosis evaluations\par -recommended early dermatological evaluations\par -recommended after the age of 50 to the age of 70, colonoscopy every 5 years\par \par f/u in 6-8 weeks\par pt is encouraged to call or fax the office with any questions or concerns.

## 2020-11-06 NOTE — PHYSICAL EXAM
[No Acute Distress] : no acute distress [Normal Oropharynx] : normal oropharynx [II] : Mallampati Class: II [Normal Appearance] : normal appearance [No Neck Mass] : no neck mass [Normal Rate/Rhythm] : normal rate/rhythm [No Resp Distress] : no resp distress [Clear to Auscultation Bilaterally] : clear to auscultation bilaterally [Kyphosis] : kyphosis [Benign] : benign [Normal Gait] : normal gait [No Clubbing] : no clubbing [No Cyanosis] : no cyanosis [No Edema] : no edema [FROM] : FROM [Normal Color/ Pigmentation] : normal color/ pigmentation [No Focal Deficits] : no focal deficits [Oriented x3] : oriented x3 [Normal Affect] : normal affect [TextBox_54] : 3/6 systolic murmur [TextBox_68] : I:E 1:3, clear

## 2020-11-06 NOTE — HISTORY OF PRESENT ILLNESS
[TextBox_4] : Ms. EZEQUIEL VERNON is a 80 year old female coming into the office for an initial evaluation. Her chief complaint is chronic cough. \par -reports a cough since August 2020. \par -she states that she did not get a cold that caused her cough. \par -she always had a PND. \par -reports SOB. \par -report coughing up blood. \par -reports fatigue. \par -states that she has SOB in stairs and inclines. \par -sleep is alright but sometimes she is unable to sleep until late at night. \par -she is now not on Prednisone. \par -she was taking prednisone for her cough. \par -reports a lot of .mucus\par -states that she has a lot of visual issues. \par -denies wheezing. \par -She reports being unable to fall asleep while watching a boring TV show. \par -She reports being unable to fall asleep as a passenger in a car for over an hour \par -memory and concentration is not the best. \par -states that her constipation is controlled through medication use. \par -sleep is restful for the few hours she sleeps. \par -wishes to sleep a little longer. \par -reports GERD\par \par -She denies any chest pain, chest pressure, diarrhea, constipation, dysphagia, dizziness, sour taste in the mouth, leg swelling, leg pain, itchy eyes, itchy ears.

## 2020-12-03 ENCOUNTER — NON-APPOINTMENT (OUTPATIENT)
Age: 80
End: 2020-12-03

## 2020-12-08 ENCOUNTER — OUTPATIENT (OUTPATIENT)
Dept: OUTPATIENT SERVICES | Facility: HOSPITAL | Age: 80
LOS: 1 days | End: 2020-12-08
Payer: MEDICARE

## 2020-12-08 ENCOUNTER — NON-APPOINTMENT (OUTPATIENT)
Age: 80
End: 2020-12-08

## 2020-12-08 ENCOUNTER — APPOINTMENT (OUTPATIENT)
Dept: CARDIOTHORACIC SURGERY | Facility: CLINIC | Age: 80
End: 2020-12-08
Payer: MEDICARE

## 2020-12-08 VITALS
BODY MASS INDEX: 23.04 KG/M2 | SYSTOLIC BLOOD PRESSURE: 181 MMHG | DIASTOLIC BLOOD PRESSURE: 100 MMHG | WEIGHT: 130 LBS | OXYGEN SATURATION: 98 % | HEIGHT: 63 IN | HEART RATE: 68 BPM | RESPIRATION RATE: 14 BRPM | TEMPERATURE: 98.1 F

## 2020-12-08 VITALS — SYSTOLIC BLOOD PRESSURE: 171 MMHG | DIASTOLIC BLOOD PRESSURE: 95 MMHG

## 2020-12-08 DIAGNOSIS — I10 ESSENTIAL (PRIMARY) HYPERTENSION: ICD-10-CM

## 2020-12-08 DIAGNOSIS — I35.0 NONRHEUMATIC AORTIC (VALVE) STENOSIS: ICD-10-CM

## 2020-12-08 PROCEDURE — 93000 ELECTROCARDIOGRAM COMPLETE: CPT

## 2020-12-08 PROCEDURE — 93306 TTE W/DOPPLER COMPLETE: CPT | Mod: 26

## 2020-12-08 PROCEDURE — 99204 OFFICE O/P NEW MOD 45 MIN: CPT

## 2020-12-08 PROCEDURE — 99072 ADDL SUPL MATRL&STAF TM PHE: CPT

## 2020-12-08 PROCEDURE — 93306 TTE W/DOPPLER COMPLETE: CPT

## 2020-12-08 PROCEDURE — 99205 OFFICE O/P NEW HI 60 MIN: CPT

## 2020-12-08 RX ORDER — SIMVASTATIN 40 MG/1
40 TABLET, FILM COATED ORAL
Qty: 60 | Refills: 0 | Status: ACTIVE | COMMUNITY
Start: 2020-12-08

## 2020-12-08 RX ORDER — AZELASTINE HYDROCHLORIDE 137 UG/1
137 SPRAY, METERED NASAL
Qty: 30 | Refills: 0 | Status: COMPLETED | COMMUNITY
Start: 2020-01-09 | End: 2020-12-08

## 2020-12-08 RX ORDER — FLUTICASONE PROPIONATE AND SALMETEROL 250; 50 UG/1; UG/1
250-50 POWDER RESPIRATORY (INHALATION)
Qty: 3 | Refills: 1 | Status: COMPLETED | COMMUNITY
Start: 2020-11-06 | End: 2020-12-08

## 2020-12-08 RX ORDER — ISOSORBIDE DINITRATE 5 MG/1
5 TABLET ORAL
Refills: 0 | Status: COMPLETED | COMMUNITY
End: 2020-12-08

## 2020-12-08 RX ORDER — ISOSORBIDE MONONITRATE 30 MG/1
30 TABLET, EXTENDED RELEASE ORAL
Refills: 0 | Status: DISCONTINUED | COMMUNITY
Start: 2020-12-08 | End: 2020-12-08

## 2020-12-08 RX ORDER — BENZONATATE 200 MG/1
200 CAPSULE ORAL
Qty: 20 | Refills: 0 | Status: COMPLETED | COMMUNITY
Start: 2020-02-04 | End: 2020-12-08

## 2020-12-08 NOTE — DISCUSSION/SUMMARY
[Moderate Aortic Stenosis] : moderate aortic stenosis [Medication Changes Per Orders] : Medication changes are as documented in orders [Patient] : the patient [Family] : the patient's family [FreeTextEntry1] : Ms Mary had a TTE today that demonstrates moderate to severe AS with an MAYRA of 1; mean gradient and velocity are in the moderate range. I suspect her symptoms are multifactorial, including underlying pulmonary disease, uncontrolled HTN, diastolic dysfunction, and aortic stenosis. She has palpitations at night and takes her long-acting nitrate at bedtime--I am recommending that she stop in and start a low dose beta blocker instead (I prescribed Labetalol 100mg BID). I asked that she see her PCP for BP check within 4 weeks. Regarding her AS, I am in agreement with Dr Estrada that we see her back, with a TTE, in approximately 4 months--certainly sooner should she develop progression of symptoms in the interim, which I reviewed with her and her daughter (she was on FaceTime for our consultation) in extensive detail.

## 2020-12-08 NOTE — PHYSICAL EXAM
[General Appearance - Alert] : alert [General Appearance - In No Acute Distress] : in no acute distress [Sclera] : the sclera and conjunctiva were normal [Outer Ear] : the ears and nose were normal in appearance [Jugular Venous Distention Increased] : there was no jugular-venous distention [Respiration, Rhythm And Depth] : normal respiratory rhythm and effort [Auscultation Breath Sounds / Voice Sounds] : lungs were clear to auscultation bilaterally [II] : a grade 2 [No Pitting Edema] : no pitting edema present [Examination Of The Chest] : the chest was normal in appearance [Breast Appearance] : normal in appearance [Bowel Sounds] : normal bowel sounds [Abdomen Soft] : soft [Cervical Lymph Nodes Enlarged Posterior Bilaterally] : posterior cervical [Cervical Lymph Nodes Enlarged Anterior Bilaterally] : anterior cervical [No CVA Tenderness] : no ~M costovertebral angle tenderness [Involuntary Movements] : no involuntary movements were seen [Skin Color & Pigmentation] : normal skin color and pigmentation [No Focal Deficits] : no focal deficits [Oriented To Time, Place, And Person] : oriented to person, place, and time [Impaired Insight] : insight and judgment were intact [Right Carotid Bruit] : no bruit heard over the right carotid [Left Carotid Bruit] : no bruit heard over the left carotid [FreeTextEntry1] : deferred

## 2020-12-08 NOTE — HISTORY OF PRESENT ILLNESS
[FreeTextEntry1] : Mrs. Mary is an 80 year old female with past medical history which includes GERD, HLD, IBS, hypothyroidism, reactive airway disease, asthma, and aortic valve stenosis. She has reported several months of progressive fatigue and dyspnea with an incline. She has no angina, PND, orthopnea, dizziness, syncope, or LE edema. She was referred by Dr. Almaraz to valve clinic for evaluation of aortic stenosis. She is receiving PT for her right shoulder (partial rotator cuff tear).  \par

## 2020-12-08 NOTE — PHYSICAL EXAM
[General Appearance - Well Developed] : well developed [General Appearance - Well Nourished] : well nourished [Normal Conjunctiva] : the conjunctiva exhibited no abnormalities [Normal Oral Mucosa] : normal oral mucosa [Normal Jugular Venous A Waves Present] : normal jugular venous A waves present [Normal Jugular Venous V Waves Present] : normal jugular venous V waves present [No Jugular Venous Key A Waves] : no jugular venous key A waves [Normal Rate] : normal [Rhythm Regular] : regular [No Pitting Edema] : no pitting edema present [Respiration, Rhythm And Depth] : normal respiratory rhythm and effort [Auscultation Breath Sounds / Voice Sounds] : lungs were clear to auscultation bilaterally [Bowel Sounds] : normal bowel sounds [Abdomen Tenderness] : non-tender [Distended] : distended [Abnormal Walk] : normal gait [Cyanosis, Localized] : no localized cyanosis [Skin Turgor] : normal skin turgor [] : no rash [Oriented To Time, Place, And Person] : oriented to person, place, and time [Impaired Insight] : insight and judgment were intact [Affect] : the affect was normal [Right Carotid Bruit] : no bruit heard over the right carotid [Left Carotid Bruit] : no bruit heard over the left carotid [Rt] : no varicose veins of the right leg [Lt] : no varicose veins of the left leg [FreeTextEntry1] : recent partial tear of right rotator cuff, unable to raise arms overhead

## 2020-12-08 NOTE — ASSESSMENT
[FreeTextEntry1] : Mrs. Mary is an 80 year old female with past medical history which includes GERD, HLD, IBS, hypothyroidism, reactive airway disease, asthma, and aortic valve stenosis. She has reported several months of progressive fatigue and dyspnea with an incline. She has no angina, PND, orthopnea, dizziness, syncope, or LE edema. She was referred by Dr. Almaraz to valve clinic for evaluation of aortic stenosis.\par \par Review of the echo reveals moderate aortic valve stenosis.  Patient has a chronic cough with some hemoptysis which is likely not related to the moderate aortic valve stenosis.  We are recommending repeat echo in 4-6 months

## 2020-12-08 NOTE — CONSULT LETTER
[Dear  ___] : Dear ~YURIDIA, [Courtesy Letter:] : I had the pleasure of seeing your patient, [unfilled], in my office today. [Please see my note below.] : Please see my note below. [Consult Closing:] : Thank you very much for allowing me to participate in the care of this patient.  If you have any questions, please do not hesitate to contact me. [Sincerely,] : Sincerely, [FreeTextEntry2] : Dr. Rogelio Almaraz [FreeTextEntry3] : Shantanu Estrada MD\par \par Cardiovascular & Thoracic Surgery\par Professor\par [facility]\par \par

## 2020-12-08 NOTE — HISTORY OF PRESENT ILLNESS
[FreeTextEntry1] : Mrs. Mary is an 80 year old female, referred by Dr. Almarza for evaluation of aortic stenosis. She follows with Dr. Almaraz for reactive airway disease and asthma, and has reported several months of progressive fatigue, chronic cough, and dyspnea with an incline. She now notes fatigue after doing household chores, and dyspnea after walking one block. She has no angina, PND, orthopnea, dizziness, syncope, or LE edema. She is hypertensive in our office today, 180/100. She states she was put on an antihypertensive by her PCP in September (Ismo), but is not sure "if it is helping me". She notes "I have sometimes palpitations in the night". She also notes some short term memory loss since July, for which she was started on Memantine. \par \par Past medical history includes diverticulosis, GERD, HLD, IBS, hypothyroidism, and reactive airway disease.Echocardiogram today demonstrates moderate aortic stenosis, MAYRA 1, mGr 21, pGr 36, AoV 3, DVI 0.3. She also notes being under significant stress, as her younger brother has COVID and is currently on a ventilator. \par \par Cardiologist Dr. Jasson Aiken [phone] ([facility])

## 2020-12-08 NOTE — REVIEW OF SYSTEMS
[Feeling Tired] : feeling tired [SOB on Exertion] : shortness of breath during exertion [Constipation] : constipation [Joint Pain] : joint pain [Joint Swelling] : joint swelling [Negative] : Heme/Lymph [Chest Pain] : no chest pain [Palpitations] : no palpitations [Orthopnea] : no orthopnea [PND] : no PND [Dizziness] : no dizziness

## 2020-12-08 NOTE — REVIEW OF SYSTEMS
[Feeling Fatigued] : feeling fatigued [Dyspnea on exertion] : dyspnea during exertion [Cough] : cough [see HPI] : see HPI [Joint Pain] : joint pain [Negative] : Endocrine [Fever] : no fever [Chills] : no chills [Shortness Of Breath] : no shortness of breath [Chest  Pressure] : no chest pressure [Chest Pain] : no chest pain [Lower Ext Edema] : no extremity edema [Palpitations] : no palpitations [Dizziness] : no dizziness [Confusion] : no confusion was observed [Anxiety] : no anxiety [Easy Bleeding] : no tendency for easy bleeding [Easy Bruising] : no tendency for easy bruising

## 2020-12-23 ENCOUNTER — NON-APPOINTMENT (OUTPATIENT)
Age: 80
End: 2020-12-23

## 2020-12-28 ENCOUNTER — APPOINTMENT (OUTPATIENT)
Dept: PULMONOLOGY | Facility: CLINIC | Age: 80
End: 2020-12-28
Payer: MEDICARE

## 2020-12-28 VITALS
OXYGEN SATURATION: 97 % | HEIGHT: 63 IN | TEMPERATURE: 97.5 F | BODY MASS INDEX: 23.04 KG/M2 | SYSTOLIC BLOOD PRESSURE: 137 MMHG | HEART RATE: 86 BPM | WEIGHT: 130 LBS | RESPIRATION RATE: 16 BRPM | DIASTOLIC BLOOD PRESSURE: 79 MMHG

## 2020-12-28 DIAGNOSIS — Z23 ENCOUNTER FOR IMMUNIZATION: ICD-10-CM

## 2020-12-28 PROCEDURE — 90670 PCV13 VACCINE IM: CPT

## 2020-12-28 PROCEDURE — G0009: CPT

## 2020-12-28 PROCEDURE — 99072 ADDL SUPL MATRL&STAF TM PHE: CPT

## 2020-12-28 PROCEDURE — 99214 OFFICE O/P EST MOD 30 MIN: CPT | Mod: 25

## 2020-12-28 RX ORDER — CEFUROXIME AXETIL 500 MG/1
500 TABLET ORAL
Qty: 20 | Refills: 0 | Status: DISCONTINUED | COMMUNITY
Start: 2020-12-23 | End: 2020-12-28

## 2020-12-28 NOTE — PHYSICAL EXAM
[No Acute Distress] : no acute distress [Normal Oropharynx] : normal oropharynx [II] : Mallampati Class: II [Normal Appearance] : normal appearance [No Neck Mass] : no neck mass [Normal Rate/Rhythm] : normal rate/rhythm [No Resp Distress] : no resp distress [Clear to Auscultation Bilaterally] : clear to auscultation bilaterally [Kyphosis] : kyphosis [Benign] : benign [Normal Gait] : normal gait [No Clubbing] : no clubbing [No Cyanosis] : no cyanosis [No Edema] : no edema [FROM] : FROM [Normal Color/ Pigmentation] : normal color/ pigmentation [No Focal Deficits] : no focal deficits [Oriented x3] : oriented x3 [Normal Affect] : normal affect [TextBox_68] : I:E 1:3, clear

## 2020-12-28 NOTE — HISTORY OF PRESENT ILLNESS
[TextBox_4] : Ms. EZEQUIEL VERNON is a 80 year old female coming into the office for an initial evaluation. Her chief complaint is\par - she notes she has not been feeling well at all\par - energy levels have been very low 5 out of 10 \par - she has been taking antibiotics\par - she feels like she is losing her voice\par - she notes she has trouble breathing, she has to take deep breaths\par - she notes that she has been coughing\par - sometimes she has a dry cough and sometimes she has been producing mucus\par - she has been bringing up greenish mucus\par - she feels like theres something in her trachea, a lump in the throat \par - She  denies any visual issues, headaches, nausea, vomiting, fever, chills, sweats, chest pains, chest pressure, diarrhea, constipation, dysphagia, myalgia, dizziness, leg swelling, leg pain, itchy eyes, itchy ears, heartburn, reflux, or sour taste in the mouth.\par

## 2020-12-28 NOTE — PROCEDURE
[FreeTextEntry1] : \par Full (11.6.2020)  PFT revealed normal flows, with a FEV1 of 1.59 L, which is 89% of predicted, normal lung volumes, and a diffusion of 15.5, which is 94% of predicted, with a normal flow volume loop.\par

## 2020-12-28 NOTE — ASSESSMENT
[FreeTextEntry1] : Ms. EZEQUIEL VERNON is a 80 year old female who has a history of Chondromalacia, diverticulosis, GERD, Elevated Cholesterol, IBS, osteoporosis, aortic stenosis, Non-smoker who now comes in for pulmonary evaluation for SOB, Chronic cough, GERD, and Post Nasal Drip Syndrome. \par \par The patient's SOB is felt to be multifactorial:\par -pulmonary \par    -Mild Asthma \par - Poor breathing mechanics (Poor balance) \par - Cardiac Disease - ?progression of Aortic Stenosis. \par -?Anemia \par \par \par Problem 1: Cardiac Disease \par -?Progression of Aortic Stenosis. \par -Recommended to f/u with the Cardiologist . \par -Recommended cardiologist for Aortic valve  \par \par \par Problem 2: Mild Asthma \par -s//p PFTs to follow c/w obstructive \par -Add Generic Advair (250) 1 inhalation BID \par -add Singulair 10 mg QHS \par \par -Inhaler technique reviewed as well as oral hygiene techniques reviewed with patient. Avoidance of cold air, extremes of temperature, rescue inhaler should be used before exercise. Order of medication reviewed with patient. Recommended use of a cool mist humidifier in the bedroom. \par \par -Asthma is believed to be caused by inherited (genetic) and environmental factor, but its exact cause is unknown. Asthma may be triggered by allergens, lung infections, or irritants in the air. Asthma triggers are different for each person \par \par Problem 3: Chronic Cough \par The Cough is felt to be multifactorial\par -asthma \par -allergies and PND \par -GERD\par -Congestive Heart Failure/ Aortic Stenosis\par \par -Any cough greater than three weeks duration-differential diagnosis includes-asthma, upper airway cough syndrome, post nasal drip syndrome, gastroesophageal reflux, laryngopharyngeal reflux, cardiac disease (congestive heart failure, medicines, effects, etc), medication effects (b-blockers, ace inhibitors, ARBs, glaucoma meds, etc.), smoking, infectious, multifactorial, etc. \par \par Problem 4:allergies and PND \par -s/p Olopatadine 0.6%1 sniff/nostril BID \par -s/p blood work for asthma profile (+), food IgE panel(+), eosinophil level(-), IgE level, Vitamin D level (low)\par \par -Environmental measures for allergies were encouraged including mattress and pillow cover, air purifier, and environmental controls. \par \par Problem 4A: low vitamin d\par - on rx\par Low vitamin D levels have been associated with asthma exacerbations and increased allergic symptoms. The goal based on recent information is maintaining levels between 50-70 and low normal is 30. Recommended 50,000 units every two weeks to once a month depending on the level. \par \par Problem 5: GERD\par -Add Omeprazole (40mg) every morning (qAM) before breakfast \par -Add Pepcid 40mg QHS \par \par - Things to avoid including overeating, spicy foods, tight clothing, eating within three hours of bed, this list is not all inclusive.\par \par - For treatment of reflux, possible options discussed including diet control, H2 blockers, PPIs, as well as coating motility agents discussed as treatment options. Timing of meals and proximity of last meal to sleep were discussed. If symptoms persist, a formal gastrointestinal evaluation is needed. \par \par Problem 5a: Dysphonia\par - get ENT f/p - et al \par Problem 6: Hemoptysis\par -related to Congestive Heart Failure \par -less endobronchial abnormalities \par -quantify the amount \par -Recommended to f/u with Cardiologist dr. De \par -Check BNP. \par -CT was normal.\par \par Problem 7: ?EVERTON\par -Complete Home Sleep Study. \par -Sleep apnea is associated with adverse clinical consequences which an affect most organ systems. Cardiovascular disease risk includes arrhythmias, atrial fibrillation, hypertension, coronary artery disease, and stroke. Metabolic disorders include diabetes type 2, non-alcoholic fatty liver disease. Mood disorder especially depression; and cognitive decline especially in the elderly. Associations with chronic reflux/Mcnair’s esophagus some but not all inclusive. \par -Reasons include arousal consistent with hypopnea; respiratory events most prominent in REM sleep or supine position; therefore sleep staging and body position are important for accurate diagnosis and estimation of AHI. \par \par Problem 8: ?Anemia \par -Complete Blood Tests: CBC and iron studies. \par \par Problem 9: Poor Breathing Mechanics\par - Proper breathing techniques were reviewed with an emphasis of exhalation. Patient instructed to breath in for 1 second and out for four seconds. Patient was encouraged to not talk while walking.\par \par Problem 10: Health maintenance\par -s/p flu shot\par -recommended strep pneumonia vaccines: Prevnar-13 vaccine (12.28.2020), followed by Pneumo vaccine 23 one year following (after the age of 65)\par -recommended early intervention for URIs\par -recommended regular osteoporosis evaluations\par -recommended early dermatological evaluations\par -recommended after the age of 50 to the age of 70, colonoscopy every 5 years\par \par f/u in 6-8 weeks\par pt is encouraged to call or fax the office with any questions or concerns.

## 2020-12-28 NOTE — ADDENDUM
[FreeTextEntry1] : Documented by Pauline Leiva acting as a scribe for Dr. Rogelio Almaraz on 12/28/2020 \par \par All medical record entries made by the Scribe were at my, Dr. Rogelio Almaraz's, direction and personally dictated by me on 12/28/2020 . I have reviewed the chart and agree that the record accurately reflects my personal performance of the history, physical exam, assessment and plan. I have also personally directed, reviewed, and agree with the discharge instructions.

## 2021-01-13 ENCOUNTER — NON-APPOINTMENT (OUTPATIENT)
Age: 81
End: 2021-01-13

## 2021-03-26 ENCOUNTER — APPOINTMENT (OUTPATIENT)
Dept: PULMONOLOGY | Facility: CLINIC | Age: 81
End: 2021-03-26

## 2021-04-07 ENCOUNTER — APPOINTMENT (OUTPATIENT)
Dept: OTOLARYNGOLOGY | Facility: CLINIC | Age: 81
End: 2021-04-07
Payer: MEDICARE

## 2021-04-07 ENCOUNTER — APPOINTMENT (OUTPATIENT)
Dept: PULMONOLOGY | Facility: CLINIC | Age: 81
End: 2021-04-07
Payer: MEDICARE

## 2021-04-07 VITALS
DIASTOLIC BLOOD PRESSURE: 66 MMHG | HEART RATE: 68 BPM | HEIGHT: 63 IN | WEIGHT: 127 LBS | TEMPERATURE: 98 F | BODY MASS INDEX: 22.5 KG/M2 | SYSTOLIC BLOOD PRESSURE: 156 MMHG

## 2021-04-07 DIAGNOSIS — K14.6 GLOSSODYNIA: ICD-10-CM

## 2021-04-07 DIAGNOSIS — R09.81 NASAL CONGESTION: ICD-10-CM

## 2021-04-07 DIAGNOSIS — H61.21 IMPACTED CERUMEN, RIGHT EAR: ICD-10-CM

## 2021-04-07 DIAGNOSIS — R49.0 DYSPHONIA: ICD-10-CM

## 2021-04-07 PROCEDURE — 99072 ADDL SUPL MATRL&STAF TM PHE: CPT

## 2021-04-07 PROCEDURE — 99204 OFFICE O/P NEW MOD 45 MIN: CPT | Mod: 25

## 2021-04-07 PROCEDURE — 31231 NASAL ENDOSCOPY DX: CPT

## 2021-04-07 PROCEDURE — G0009: CPT

## 2021-04-07 PROCEDURE — 90732 PPSV23 VACC 2 YRS+ SUBQ/IM: CPT

## 2021-04-07 NOTE — PHYSICAL EXAM
[Midline] : trachea located in midline position [Normal] : no rashes [de-identified] : Right with cerumen  [] : septum deviated to the left [de-identified] : no sign of thrush

## 2021-04-07 NOTE — CONSULT LETTER
[Dear  ___] : Dear  [unfilled], [Consult Letter:] : I had the pleasure of evaluating your patient, [unfilled]. [Please see my note below.] : Please see my note below. [Consult Closing:] : Thank you very much for allowing me to participate in the care of this patient.  If you have any questions, please do not hesitate to contact me. [Sincerely,] : Sincerely, [DrDewayne  ___] : Dr. SMILEY [FreeTextEntry3] : Miriam Grove MD\par Otolaryngology and Cranial Base Surgery\par Attending Physician - Department of Otolaryngology and Head & Neck Surgery\par [facility]\par  - [employer] [facility]\par Office: [phone]\par Fax: [phone]\par

## 2021-04-07 NOTE — ASSESSMENT
[FreeTextEntry1] : Nasal congestion, likely with allergic component:\par - Continue Flonase daily, discussed need to use every day consistently \par - Will represcribe Azelastine and advised to use daily\par - Start nasal saline spray daily for dryness left septum\par \par LPRD / raspy voice:\par - Continue taking Omeprazole and Pepcid. \par \par Burning tongue syndrome:\par - Can be related to LPRD\par - Can consider Neurology f/u for gabapentin if persists\par \par Cerumen :\par - Removed in office today\par - Can use Debrox PRN\par - Can use Mineral oil 1-2 gtts as needed \par - F/U one year for removal

## 2021-04-07 NOTE — HISTORY OF PRESENT ILLNESS
[de-identified] : 81 y/o F with chronic mild congestion and sinus pressure.  Now notes over the past 2 weeks has increased nasal congestion and sinus pressure.  She uses Flonase for the past 2 weeks, but has now ran out.   Dr. Almaraz noted pos allergies and mild asthma.  Was Rx. Azelastine and Singulair however is not using them. \par She also notes GERD with throat clearing and raspy.  Eating and drinking well. Is using Omeprazole and Pepcid. \par Notes has one year of burning tongue.

## 2021-04-19 ENCOUNTER — APPOINTMENT (OUTPATIENT)
Dept: GASTROENTEROLOGY | Facility: CLINIC | Age: 81
End: 2021-04-19
Payer: MEDICARE

## 2021-04-19 VITALS
TEMPERATURE: 97.8 F | HEIGHT: 63 IN | BODY MASS INDEX: 23.39 KG/M2 | DIASTOLIC BLOOD PRESSURE: 80 MMHG | WEIGHT: 132 LBS | SYSTOLIC BLOOD PRESSURE: 128 MMHG

## 2021-04-19 DIAGNOSIS — R10.9 UNSPECIFIED ABDOMINAL PAIN: ICD-10-CM

## 2021-04-19 PROCEDURE — 99214 OFFICE O/P EST MOD 30 MIN: CPT

## 2021-04-19 PROCEDURE — 99072 ADDL SUPL MATRL&STAF TM PHE: CPT

## 2021-04-19 RX ORDER — POLYETHYLENE GLYCOL 3350 17 G/17G
17 POWDER, FOR SOLUTION ORAL DAILY
Qty: 30 | Refills: 3 | Status: ACTIVE | COMMUNITY
Start: 2021-04-19 | End: 1900-01-01

## 2021-04-19 NOTE — REVIEW OF SYSTEMS
[Fever] : no fever [Chills] : no chills [Eyesight Problems] : no eyesight problems [Nosebleeds] : no nosebleeds [Heart Rate Is Slow] : the heart rate was not slow [Chest Pain] : no chest pain [Shortness Of Breath] : no shortness of breath [Cough] : no cough [SOB on Exertion] : no shortness of breath during exertion [As Noted in HPI] : as noted in HPI [Dysuria] : no dysuria [Pelvic Pain] : no pelvic pain [Arthralgias] : no arthralgias [Joint Pain] : no joint pain [Dizziness] : no dizziness [Anxiety] : no anxiety [Depression] : no depression [Muscle Weakness] : no muscle weakness [Easy Bleeding] : no tendency for easy bleeding [Easy Bruising] : no tendency for easy bruising

## 2021-04-19 NOTE — ASSESSMENT
[FreeTextEntry1] : 1. chronic llq pain with constipation and bloating, partially improved with metamucil, , had severe episode in [id number],\par probable IBS with constipation\par \par plan d/c metamucil\par        linzess daily\par       if pt unable to obtain linzess, recommend miralax\par           ct scan abdomen/pelvis\par          low FODMAP diet\par           pt to fax in recent egd/colonoscopy reports.\par           probiotics.\par \par 2. refractory gerd\par \par plan increase ppi in am 40mg daily

## 2021-04-19 NOTE — PHYSICAL EXAM
[General Appearance - Alert] : alert [General Appearance - In No Acute Distress] : in no acute distress [General Appearance - Well Nourished] : well nourished [General Appearance - Well Developed] : well developed [Sclera] : the sclera and conjunctiva were normal [Hearing Threshold Finger Rub Not Sandoval] : hearing was normal [Respiration, Rhythm And Depth] : normal respiratory rhythm and effort [Auscultation Breath Sounds / Voice Sounds] : lungs were clear to auscultation bilaterally [Heart Sounds] : normal S1 and S2 [Bowel Sounds] : normal bowel sounds [Abdomen Soft] : soft [No CVA Tenderness] : no ~M costovertebral angle tenderness [Abnormal Walk] : normal gait [] : no rash [Skin Lesions] : no skin lesions [Sensation] : the sensory exam was normal to light touch and pinprick [Motor Exam] : the motor exam was normal [No Focal Deficits] : no focal deficits [Oriented To Time, Place, And Person] : oriented to person, place, and time

## 2021-04-19 NOTE — HISTORY OF PRESENT ILLNESS
[FreeTextEntry1] : 79 yo female with h/o IBS , s/p egd/colonoscopy 2016 .Patient reports abdominal cramps with constipation. \par Patient with h/o diverticulosis,  takes metamucil. without much relief. patient reports flatus. no n/v. no gerd\par patient  takes omeprazole in am, famotidine qhs. no weight loss. takes hyoscamine prn\par \par Patient reports  did egd/colonoscopy on her approximately 3 years ago unrevealing\par

## 2021-04-20 ENCOUNTER — APPOINTMENT (OUTPATIENT)
Dept: GASTROENTEROLOGY | Facility: CLINIC | Age: 81
End: 2021-04-20

## 2021-04-20 ENCOUNTER — APPOINTMENT (OUTPATIENT)
Dept: CARDIOTHORACIC SURGERY | Facility: CLINIC | Age: 81
End: 2021-04-20

## 2021-05-05 ENCOUNTER — APPOINTMENT (OUTPATIENT)
Dept: PULMONOLOGY | Facility: CLINIC | Age: 81
End: 2021-05-05

## 2021-05-06 ENCOUNTER — RESULT REVIEW (OUTPATIENT)
Age: 81
End: 2021-05-06

## 2021-06-16 ENCOUNTER — OUTPATIENT (OUTPATIENT)
Dept: OUTPATIENT SERVICES | Facility: HOSPITAL | Age: 81
LOS: 1 days | End: 2021-06-16
Payer: MEDICARE

## 2021-06-16 ENCOUNTER — APPOINTMENT (OUTPATIENT)
Dept: CT IMAGING | Facility: IMAGING CENTER | Age: 81
End: 2021-06-16
Payer: MEDICARE

## 2021-06-16 DIAGNOSIS — R10.9 UNSPECIFIED ABDOMINAL PAIN: ICD-10-CM

## 2021-06-16 PROCEDURE — 74176 CT ABD & PELVIS W/O CONTRAST: CPT | Mod: 26

## 2021-06-16 PROCEDURE — 74176 CT ABD & PELVIS W/O CONTRAST: CPT

## 2021-08-09 ENCOUNTER — OUTPATIENT (OUTPATIENT)
Dept: OUTPATIENT SERVICES | Facility: HOSPITAL | Age: 81
LOS: 1 days | End: 2021-08-09
Payer: MEDICARE

## 2021-08-09 ENCOUNTER — APPOINTMENT (OUTPATIENT)
Dept: RADIOLOGY | Facility: IMAGING CENTER | Age: 81
End: 2021-08-09
Payer: MEDICARE

## 2021-08-09 DIAGNOSIS — Z00.8 ENCOUNTER FOR OTHER GENERAL EXAMINATION: ICD-10-CM

## 2021-08-09 PROCEDURE — 71046 X-RAY EXAM CHEST 2 VIEWS: CPT | Mod: 26

## 2021-08-09 PROCEDURE — 73630 X-RAY EXAM OF FOOT: CPT | Mod: 26,LT

## 2021-08-09 PROCEDURE — 73610 X-RAY EXAM OF ANKLE: CPT | Mod: 26,LT

## 2021-08-09 PROCEDURE — 71046 X-RAY EXAM CHEST 2 VIEWS: CPT

## 2021-08-09 PROCEDURE — 73630 X-RAY EXAM OF FOOT: CPT

## 2021-08-09 PROCEDURE — 73610 X-RAY EXAM OF ANKLE: CPT

## 2021-10-06 PROBLEM — I10 ESSENTIAL HYPERTENSION: Status: ACTIVE | Noted: 2020-12-08

## 2021-12-16 ENCOUNTER — APPOINTMENT (OUTPATIENT)
Dept: OBGYN | Facility: CLINIC | Age: 81
End: 2021-12-16
Payer: MEDICARE

## 2021-12-16 VITALS
HEART RATE: 69 BPM | WEIGHT: 122 LBS | HEIGHT: 63 IN | SYSTOLIC BLOOD PRESSURE: 107 MMHG | BODY MASS INDEX: 21.62 KG/M2 | DIASTOLIC BLOOD PRESSURE: 59 MMHG

## 2021-12-16 DIAGNOSIS — R14.0 ABDOMINAL DISTENSION (GASEOUS): ICD-10-CM

## 2021-12-16 DIAGNOSIS — N95.9 UNSPECIFIED MENOPAUSAL AND PERIMENOPAUSAL DISORDER: ICD-10-CM

## 2021-12-16 DIAGNOSIS — R39.9 UNSPECIFIED SYMPTOMS AND SIGNS INVOLVING THE GENITOURINARY SYSTEM: ICD-10-CM

## 2021-12-16 PROCEDURE — 99203 OFFICE O/P NEW LOW 30 MIN: CPT

## 2021-12-16 NOTE — PHYSICAL EXAM
[Appropriately responsive] : appropriately responsive [Alert] : alert [No Acute Distress] : no acute distress [No Lymphadenopathy] : no lymphadenopathy [Soft] : soft [Non-tender] : non-tender [Non-distended] : non-distended [No HSM] : No HSM [No Lesions] : no lesions [No Mass] : no mass [Oriented x3] : oriented x3 [Examination Of The Breasts] : a normal appearance [No Masses] : no breast masses were palpable [Vulvar Atrophy] : vulvar atrophy [Labia Majora] : normal [Labia Minora] : normal [Atrophy] : atrophy [Normal] : normal [Uterine Adnexae] : normal [FreeTextEntry4] : 5mm cyst near left introitus

## 2021-12-16 NOTE — REVIEW OF SYSTEMS
[Negative] : Heme/Lymph [Fatigue] : fatigue [Abdominal Pain] : abdominal pain [Diarrhea] : diarrhea [Bloating] : bloating [Dysuria] : dysuria [Arthralgias] : arthralgias [FreeTextEntry8] : KRISSY page

## 2021-12-16 NOTE — HISTORY OF PRESENT ILLNESS
[TextBox_4] : P1 [Mammogramdate] : 2021 [PapSmeardate] : 2015 [ColonoscopyDate] : 2017 [Previously active] : previously active

## 2021-12-17 ENCOUNTER — NON-APPOINTMENT (OUTPATIENT)
Age: 81
End: 2021-12-17

## 2021-12-19 LAB
APPEARANCE: CLEAR
BACTERIA UR CULT: ABNORMAL
BACTERIA: NEGATIVE
BILIRUBIN URINE: NEGATIVE
BLOOD URINE: NEGATIVE
CANDIDA VAG CYTO: NOT DETECTED
COLOR: YELLOW
G VAGINALIS+PREV SP MTYP VAG QL MICRO: NOT DETECTED
GLUCOSE QUALITATIVE U: NEGATIVE
HPV HIGH+LOW RISK DNA PNL CVX: NOT DETECTED
HYALINE CASTS: 0 /LPF
KETONES URINE: NEGATIVE
LEUKOCYTE ESTERASE URINE: NEGATIVE
MICROSCOPIC-UA: NORMAL
NITRITE URINE: NEGATIVE
PH URINE: 6
PROTEIN URINE: NEGATIVE
RED BLOOD CELLS URINE: 1 /HPF
SPECIFIC GRAVITY URINE: 1.01
SQUAMOUS EPITHELIAL CELLS: 1 /HPF
T VAGINALIS VAG QL WET PREP: NOT DETECTED
URINE CYTOLOGY: NORMAL
UROBILINOGEN URINE: NORMAL
WHITE BLOOD CELLS URINE: 1 /HPF

## 2021-12-20 ENCOUNTER — NON-APPOINTMENT (OUTPATIENT)
Age: 81
End: 2021-12-20

## 2021-12-20 DIAGNOSIS — N39.0 URINARY TRACT INFECTION, SITE NOT SPECIFIED: ICD-10-CM

## 2021-12-20 LAB — CYTOLOGY CVX/VAG DOC THIN PREP: ABNORMAL

## 2021-12-28 ENCOUNTER — APPOINTMENT (OUTPATIENT)
Dept: ULTRASOUND IMAGING | Facility: IMAGING CENTER | Age: 81
End: 2021-12-28

## 2022-01-03 ENCOUNTER — APPOINTMENT (OUTPATIENT)
Dept: PULMONOLOGY | Facility: CLINIC | Age: 82
End: 2022-01-03

## 2022-01-20 ENCOUNTER — APPOINTMENT (OUTPATIENT)
Dept: GASTROENTEROLOGY | Facility: CLINIC | Age: 82
End: 2022-01-20

## 2022-02-06 ENCOUNTER — APPOINTMENT (OUTPATIENT)
Dept: ULTRASOUND IMAGING | Facility: IMAGING CENTER | Age: 82
End: 2022-02-06
Payer: MEDICARE

## 2022-02-06 ENCOUNTER — OUTPATIENT (OUTPATIENT)
Dept: OUTPATIENT SERVICES | Facility: HOSPITAL | Age: 82
LOS: 1 days | End: 2022-02-06
Payer: MEDICARE

## 2022-02-06 DIAGNOSIS — R14.0 ABDOMINAL DISTENSION (GASEOUS): ICD-10-CM

## 2022-02-06 PROCEDURE — 76830 TRANSVAGINAL US NON-OB: CPT

## 2022-02-06 PROCEDURE — 76830 TRANSVAGINAL US NON-OB: CPT | Mod: 26

## 2022-02-08 ENCOUNTER — NON-APPOINTMENT (OUTPATIENT)
Age: 82
End: 2022-02-08

## 2022-03-17 ENCOUNTER — APPOINTMENT (OUTPATIENT)
Dept: OBGYN | Facility: CLINIC | Age: 82
End: 2022-03-17
Payer: MEDICARE

## 2022-03-17 VITALS
HEART RATE: 73 BPM | BODY MASS INDEX: 22.32 KG/M2 | SYSTOLIC BLOOD PRESSURE: 129 MMHG | HEIGHT: 63 IN | WEIGHT: 126 LBS | DIASTOLIC BLOOD PRESSURE: 73 MMHG

## 2022-03-17 DIAGNOSIS — L29.2 PRURITUS VULVAE: ICD-10-CM

## 2022-03-17 PROCEDURE — 99213 OFFICE O/P EST LOW 20 MIN: CPT

## 2022-03-17 NOTE — PHYSICAL EXAM
[Chaperone Present] : A chaperone was present in the examining room during all aspects of the physical examination [Appropriately responsive] : appropriately responsive [Alert] : alert [No Acute Distress] : no acute distress [Normal] : normal external genitalia [Vulvar Atrophy] : vulvar atrophy [No Lesions] : no lesions  [Labia Majora] : normal [Labia Minora] : normal

## 2022-06-14 ENCOUNTER — APPOINTMENT (OUTPATIENT)
Dept: OBGYN | Facility: CLINIC | Age: 82
End: 2022-06-14

## 2022-07-20 ENCOUNTER — APPOINTMENT (OUTPATIENT)
Dept: OBGYN | Facility: CLINIC | Age: 82
End: 2022-07-20

## 2022-07-20 ENCOUNTER — NON-APPOINTMENT (OUTPATIENT)
Age: 82
End: 2022-07-20

## 2022-07-20 VITALS
BODY MASS INDEX: 23.04 KG/M2 | DIASTOLIC BLOOD PRESSURE: 74 MMHG | HEIGHT: 63 IN | WEIGHT: 130 LBS | HEART RATE: 73 BPM | SYSTOLIC BLOOD PRESSURE: 143 MMHG

## 2022-07-20 DIAGNOSIS — N76.6 ULCERATION OF VULVA: ICD-10-CM

## 2022-07-20 PROCEDURE — 99214 OFFICE O/P EST MOD 30 MIN: CPT

## 2022-07-20 RX ORDER — ASPIRIN 81 MG/1
81 TABLET, COATED ORAL
Qty: 30 | Refills: 0 | Status: DISCONTINUED | COMMUNITY
Start: 2019-12-23 | End: 2022-07-20

## 2022-07-20 RX ORDER — FLUTICASONE PROPIONATE AND SALMETEROL 250; 50 UG/1; UG/1
250-50 POWDER RESPIRATORY (INHALATION)
Qty: 1 | Refills: 0 | Status: DISCONTINUED | COMMUNITY
Start: 2020-11-06 | End: 2022-07-20

## 2022-07-20 RX ORDER — FLUTICASONE PROPIONATE 50 UG/1
50 SPRAY, METERED NASAL TWICE DAILY
Qty: 3 | Refills: 3 | Status: DISCONTINUED | COMMUNITY
Start: 2021-04-07 | End: 2022-07-20

## 2022-07-20 RX ORDER — PHENAZOPYRIDINE HYDROCHLORIDE 200 MG/1
200 TABLET ORAL
Qty: 20 | Refills: 0 | Status: DISCONTINUED | COMMUNITY
Start: 2022-06-10

## 2022-07-20 RX ORDER — CLOTRIMAZOLE 10 MG/G
1 CREAM TOPICAL
Qty: 30 | Refills: 0 | Status: DISCONTINUED | COMMUNITY
Start: 2020-11-16 | End: 2022-07-20

## 2022-07-20 RX ORDER — AZELASTINE HYDROCHLORIDE 137 UG/1
0.1 SPRAY, METERED NASAL TWICE DAILY
Qty: 3 | Refills: 3 | Status: DISCONTINUED | COMMUNITY
Start: 2021-04-07 | End: 2022-07-20

## 2022-07-20 RX ORDER — CEPHALEXIN 500 MG/1
500 TABLET ORAL
Qty: 14 | Refills: 0 | Status: DISCONTINUED | COMMUNITY
Start: 2021-12-20 | End: 2022-07-20

## 2022-07-20 RX ORDER — METOPROLOL SUCCINATE 25 MG/1
25 TABLET, EXTENDED RELEASE ORAL
Qty: 90 | Refills: 0 | Status: ACTIVE | COMMUNITY
Start: 2022-04-07

## 2022-07-20 RX ORDER — VALACYCLOVIR 1 G/1
1 TABLET, FILM COATED ORAL 3 TIMES DAILY
Qty: 21 | Refills: 0 | Status: ACTIVE | COMMUNITY
Start: 2022-07-20 | End: 1900-01-01

## 2022-07-20 RX ORDER — SILVER SULFADIAZINE 10 G/1000G
1 CREAM TOPICAL
Qty: 85 | Refills: 0 | Status: ACTIVE | COMMUNITY
Start: 2022-06-24

## 2022-07-20 RX ORDER — LEVOFLOXACIN 500 MG/1
500 TABLET, FILM COATED ORAL
Qty: 7 | Refills: 0 | Status: DISCONTINUED | COMMUNITY
Start: 2022-06-10

## 2022-07-21 ENCOUNTER — NON-APPOINTMENT (OUTPATIENT)
Age: 82
End: 2022-07-21

## 2022-07-25 ENCOUNTER — NON-APPOINTMENT (OUTPATIENT)
Age: 82
End: 2022-07-25

## 2022-07-27 ENCOUNTER — APPOINTMENT (OUTPATIENT)
Dept: OBGYN | Facility: CLINIC | Age: 82
End: 2022-07-27

## 2022-07-27 VITALS
HEIGHT: 63 IN | HEART RATE: 79 BPM | BODY MASS INDEX: 22.86 KG/M2 | DIASTOLIC BLOOD PRESSURE: 72 MMHG | WEIGHT: 129 LBS | SYSTOLIC BLOOD PRESSURE: 126 MMHG

## 2022-07-27 DIAGNOSIS — B02.9 ZOSTER W/OUT COMPLICATIONS: ICD-10-CM

## 2022-07-27 PROCEDURE — 99213 OFFICE O/P EST LOW 20 MIN: CPT

## 2022-07-27 NOTE — PLAN
[FreeTextEntry1] : 82 y/o with likely shingles of buttocks and vulvar area\par \par Plan:\par - Shingles in buttocks seem to be healing, however, still present in vulvar area\par - Recommended that patient use the lidocaine cream as instructed previously--can also apply barrier protection with aquafor or vaseline to prevent friction against her clothing\par - INstructed to not wear underwear at night or tight fitting clothing\par - continue sitz baths and pat dry\par - patient to follow up with PCP in regards to medication for neuropathic pain. Can take tylenol for now\par - urine cx collected to r/o UTI\par - will f/u results

## 2022-07-27 NOTE — PHYSICAL EXAM
[Chaperone Present] : A chaperone was present in the examining room during all aspects of the physical examination [Appropriately responsive] : appropriately responsive [Alert] : alert [No Acute Distress] : no acute distress [No Lymphadenopathy] : no lymphadenopathy [Regular Rate Rhythm] : regular rate rhythm [No Murmurs] : no murmurs [Clear to Auscultation B/L] : clear to auscultation bilaterally [Soft] : soft [Non-tender] : non-tender [Non-distended] : non-distended [No HSM] : No HSM [No Lesions] : no lesions [No Mass] : no mass [Oriented x3] : oriented x3 [Labia Majora] : normal [Labia Minora] : normal [Normal] : normal [Uterine Adnexae] : normal [FreeTextEntry1] : Right vulva erythematous and slightly swollen with ulcerations. Ulcerations are not bleeding and not infected appearing

## 2022-07-29 LAB — BACTERIA UR CULT: NORMAL

## 2022-08-01 ENCOUNTER — NON-APPOINTMENT (OUTPATIENT)
Age: 82
End: 2022-08-01

## 2022-08-01 ENCOUNTER — APPOINTMENT (OUTPATIENT)
Dept: UROLOGY | Facility: CLINIC | Age: 82
End: 2022-08-01

## 2022-08-01 VITALS
SYSTOLIC BLOOD PRESSURE: 123 MMHG | HEART RATE: 64 BPM | RESPIRATION RATE: 15 BRPM | DIASTOLIC BLOOD PRESSURE: 62 MMHG | HEIGHT: 63 IN | WEIGHT: 130 LBS | BODY MASS INDEX: 23.04 KG/M2

## 2022-08-01 DIAGNOSIS — R33.8 OTHER RETENTION OF URINE: ICD-10-CM

## 2022-08-01 DIAGNOSIS — R39.9 UNSPECIFIED SYMPTOMS AND SIGNS INVOLVING THE GENITOURINARY SYSTEM: ICD-10-CM

## 2022-08-01 DIAGNOSIS — R10.2 PELVIC AND PERINEAL PAIN: ICD-10-CM

## 2022-08-01 PROCEDURE — 99205 OFFICE O/P NEW HI 60 MIN: CPT

## 2022-08-01 RX ORDER — TAMSULOSIN HYDROCHLORIDE 0.4 MG/1
0.4 CAPSULE ORAL
Qty: 90 | Refills: 3 | Status: ACTIVE | COMMUNITY
Start: 2022-08-01 | End: 1900-01-01

## 2022-08-04 LAB
HHV SPEC CULT: NORMAL
HSV TYPE 1: NORMAL
HSV TYPE 2: NORMAL

## 2022-08-05 NOTE — PHYSICAL EXAM
[General Appearance - Well Developed] : well developed [General Appearance - Well Nourished] : well nourished [Normal Appearance] : normal appearance [Well Groomed] : well groomed [General Appearance - In No Acute Distress] : no acute distress [Edema] : no peripheral edema [Respiration, Rhythm And Depth] : normal respiratory rhythm and effort [Exaggerated Use Of Accessory Muscles For Inspiration] : no accessory muscle use [Abdomen Soft] : soft [Abdomen Tenderness] : non-tender [Costovertebral Angle Tenderness] : no ~M costovertebral angle tenderness [Normal Station and Gait] : the gait and station were normal for the patient's age [] : no rash [No Focal Deficits] : no focal deficits [Oriented To Time, Place, And Person] : oriented to person, place, and time [Affect] : the affect was normal [Mood] : the mood was normal [Not Anxious] : not anxious [No Palpable Adenopathy] : no palpable adenopathy [Urethral Meatus] : normal urethra [FreeTextEntry1] : right vulvar ulcer, no prolapse

## 2022-08-05 NOTE — HISTORY OF PRESENT ILLNESS
[FreeTextEntry1] : 82 yo F presents with intermittent history of pelvic pain for several years\par Recently has been having dysuria, urinary frequency and urgency\par Voiding only small amounts and incomplete bladder emptying sensation\par Also with mixed incontinence - now using 3 pull-ups per day\par At baseline, usually voids every 4 hrs or so, nocturia 1-2/night\par Went to gyn in March and was told she had a UTI\par Last dose of abx was in April\par Prior to this, last UTI was many years ago\par Recently developed shingles along her right buttock and vulva - unable to sit\par Drinks 6-7 cups of water, 1 cup of coffee\par chronic constipation\par 1 child, \par LMP = 30 yrs ago, not sexually active

## 2022-08-05 NOTE — ASSESSMENT
1/1/2017          Ilya Rayo  [id number] [place] [address]    Dear Ilya:    [facility] wants to ensure your discharge home is safe and you or your loved ones have had all your questions answered regarding your care after you leave the hospital.    You may receive a telephone call within two days of your discharge.  This call is to make certain you understand your discharge instructions as well as ensure we provided you with the best care possible during your stay with us.     The call will only last approximately 3-5 minutes and will be done by a nurse.    Once again, we want to ensure your discharge home is safe and that you have a clear understanding of any next steps in your care.  If you have any questions or concerns, please do not hesitate to contact us, we are here for you.  Thank you for choosing [facility] for your healthcare needs.    Sincerely,    Romie Nolan    [facility]         [FreeTextEntry1] : 82 yo F with LUTS, pelvic pain\par \par - PVR = 1400ml. Catheter placed under usual sterile conditions with evacuation of 1400ml\par - Pt insisted on having the catheter removed prior to going home. Spent extended period of time reviewing the risks of urinary retention. However, pt refused catheter at this time and catheter was removed\par - UA, culture\par - Discussed timed voiding and double voiding\par - Will follow-up next week for repeat bladder scan\par - Reviewed indications for seeking immediate medical attention\par

## 2022-08-08 LAB
APPEARANCE: CLEAR
BACTERIA UR CULT: NORMAL
BACTERIA: NEGATIVE
BILIRUBIN URINE: NEGATIVE
BLOOD URINE: NEGATIVE
COLOR: NORMAL
GLUCOSE QUALITATIVE U: NEGATIVE
HYALINE CASTS: 0 /LPF
KETONES URINE: NEGATIVE
LEUKOCYTE ESTERASE URINE: NEGATIVE
MICROSCOPIC-UA: NORMAL
NITRITE URINE: NEGATIVE
PH URINE: 6.5
PROTEIN URINE: NEGATIVE
RED BLOOD CELLS URINE: 1 /HPF
SPECIFIC GRAVITY URINE: 1.01
SQUAMOUS EPITHELIAL CELLS: 0 /HPF
UROBILINOGEN URINE: NORMAL
WHITE BLOOD CELLS URINE: 0 /HPF

## 2022-08-11 ENCOUNTER — APPOINTMENT (OUTPATIENT)
Dept: UROLOGY | Facility: CLINIC | Age: 82
End: 2022-08-11

## 2022-08-24 ENCOUNTER — APPOINTMENT (OUTPATIENT)
Dept: OBGYN | Facility: CLINIC | Age: 82
End: 2022-08-24

## 2022-09-28 ENCOUNTER — APPOINTMENT (OUTPATIENT)
Dept: OBGYN | Facility: CLINIC | Age: 82
End: 2022-09-28

## 2022-10-13 ENCOUNTER — APPOINTMENT (OUTPATIENT)
Dept: OBGYN | Facility: CLINIC | Age: 82
End: 2022-10-13

## 2022-10-13 VITALS
HEIGHT: 63 IN | WEIGHT: 125 LBS | HEART RATE: 61 BPM | BODY MASS INDEX: 22.15 KG/M2 | SYSTOLIC BLOOD PRESSURE: 162 MMHG | DIASTOLIC BLOOD PRESSURE: 72 MMHG

## 2022-10-13 VITALS — SYSTOLIC BLOOD PRESSURE: 151 MMHG | DIASTOLIC BLOOD PRESSURE: 75 MMHG

## 2022-10-13 DIAGNOSIS — N90.9 NONINFLAMMATORY DISORDER OF VULVA AND PERINEUM, UNSPECIFIED: ICD-10-CM

## 2022-10-13 DIAGNOSIS — M79.2 NEURALGIA AND NEURITIS, UNSPECIFIED: ICD-10-CM

## 2022-10-13 DIAGNOSIS — N90.89 OTHER SPECIFIED NONINFLAMMATORY DISORDERS OF VULVA AND PERINEUM: ICD-10-CM

## 2022-10-13 PROCEDURE — 56820 COLPOSCOPY VULVA: CPT

## 2022-10-13 PROCEDURE — 99213 OFFICE O/P EST LOW 20 MIN: CPT | Mod: 25

## 2022-10-13 RX ORDER — GABAPENTIN 300 MG/1
300 CAPSULE ORAL
Qty: 60 | Refills: 3 | Status: ACTIVE | COMMUNITY
Start: 2022-10-13 | End: 1900-01-01

## 2022-10-13 RX ORDER — GABAPENTIN 100 MG/1
100 CAPSULE ORAL
Qty: 90 | Refills: 0 | Status: COMPLETED | COMMUNITY
Start: 2017-06-26 | End: 2022-10-13

## 2022-10-13 NOTE — PHYSICAL EXAM
[Chaperone Present] : A chaperone was present in the examining room during all aspects of the physical examination [Appropriately responsive] : appropriately responsive [Alert] : alert [No Acute Distress] : no acute distress [Normal] : normal external genitalia [Vulvar Atrophy] : vulvar atrophy

## 2022-10-15 NOTE — PROCEDURE
[Colposcopy] : Colposcopy  [Time out performed] : Pre-procedure time out performed.  Patient's name, date of birth and procedure confirmed. [Consent Obtained] : Consent obtained [Risks] : risks [Benefits] : benefits [Patient] : patient [Alternatives] : alternatives [Infection] : infection [Bleeding] : bleeding [Allergic Reaction] : allergic reaction [No Abnormalities] : no abnormalities [Tolerated Well] : the patient tolerated the procedure well [de-identified] : vulvar lesion

## 2022-10-15 NOTE — COUNSELING
[Bladder Hygiene] : bladder hygiene [Medication Management] : medication management [FreeTextEntry2] : vulvovag health

## 2022-12-01 ENCOUNTER — APPOINTMENT (OUTPATIENT)
Dept: GASTROENTEROLOGY | Facility: CLINIC | Age: 82
End: 2022-12-01
Payer: MEDICARE

## 2022-12-01 VITALS
BODY MASS INDEX: 20.91 KG/M2 | DIASTOLIC BLOOD PRESSURE: 60 MMHG | OXYGEN SATURATION: 98 % | HEART RATE: 67 BPM | HEIGHT: 63 IN | SYSTOLIC BLOOD PRESSURE: 136 MMHG | WEIGHT: 118 LBS | TEMPERATURE: 98 F

## 2022-12-01 DIAGNOSIS — K59.00 CONSTIPATION, UNSPECIFIED: ICD-10-CM

## 2022-12-01 PROCEDURE — 99215 OFFICE O/P EST HI 40 MIN: CPT

## 2022-12-01 PROCEDURE — 99205 OFFICE O/P NEW HI 60 MIN: CPT

## 2022-12-01 RX ORDER — AZELASTINE HYDROCHLORIDE 205.5 UG/1
0.15 SPRAY, METERED NASAL TWICE DAILY
Qty: 3 | Refills: 1 | Status: DISCONTINUED | COMMUNITY
Start: 2020-12-28 | End: 2022-12-01

## 2022-12-01 RX ORDER — HALOBETASOL PROPIONATE 0.5 MG/G
0.05 CREAM TOPICAL
Qty: 50 | Refills: 0 | Status: DISCONTINUED | COMMUNITY
Start: 2020-10-22 | End: 2022-12-01

## 2022-12-01 RX ORDER — VIT A/VIT C/VIT E/ZINC/COPPER 4296-226
CAPSULE ORAL
Refills: 0 | Status: DISCONTINUED | COMMUNITY
Start: 2020-12-08 | End: 2022-12-01

## 2022-12-01 RX ORDER — MEMANTINE HYDROCHLORIDE 10 MG/1
10 TABLET, FILM COATED ORAL
Refills: 0 | Status: ACTIVE | COMMUNITY
Start: 2022-12-01

## 2022-12-01 RX ORDER — FLUOCINONIDE 0.5 MG/ML
0.05 SOLUTION TOPICAL
Qty: 60 | Refills: 0 | Status: DISCONTINUED | COMMUNITY
Start: 2020-11-16 | End: 2022-12-01

## 2022-12-01 RX ORDER — MEMANTINE HYDROCHLORIDE 10 MG/1
10 TABLET, FILM COATED ORAL TWICE DAILY
Refills: 0 | Status: DISCONTINUED | COMMUNITY
Start: 2020-12-08 | End: 2022-12-01

## 2022-12-01 RX ORDER — ALBUTEROL SULFATE 90 UG/1
108 (90 BASE) INHALANT RESPIRATORY (INHALATION)
Qty: 8 | Refills: 0 | Status: DISCONTINUED | COMMUNITY
Start: 2020-07-31 | End: 2022-12-01

## 2022-12-01 RX ORDER — MOMETASONE FUROATE 1 MG/ML
0.1 SOLUTION TOPICAL
Qty: 60 | Refills: 0 | Status: DISCONTINUED | COMMUNITY
Start: 2022-05-17 | End: 2022-12-01

## 2022-12-01 RX ORDER — CLOTRIMAZOLE AND BETAMETHASONE DIPROPIONATE 10; .5 MG/G; MG/G
1-0.05 CREAM TOPICAL
Qty: 45 | Refills: 0 | Status: DISCONTINUED | COMMUNITY
Start: 2020-08-01 | End: 2022-12-01

## 2022-12-01 RX ORDER — LINACLOTIDE 145 UG/1
145 CAPSULE, GELATIN COATED ORAL
Qty: 30 | Refills: 3 | Status: DISCONTINUED | COMMUNITY
Start: 2021-04-19 | End: 2022-12-01

## 2022-12-01 RX ORDER — TRIAMCINOLONE ACETONIDE 1 MG/G
0.1 OINTMENT TOPICAL
Qty: 1 | Refills: 2 | Status: DISCONTINUED | COMMUNITY
Start: 2022-03-17 | End: 2022-12-01

## 2022-12-01 RX ORDER — OMEPRAZOLE 40 MG/1
40 CAPSULE, DELAYED RELEASE ORAL
Qty: 30 | Refills: 3 | Status: ACTIVE | COMMUNITY
Start: 2020-05-04 | End: 1900-01-01

## 2022-12-01 RX ORDER — NYSTATIN 100000 U/G
100000 OINTMENT TOPICAL
Qty: 30 | Refills: 2 | Status: DISCONTINUED | COMMUNITY
Start: 2022-03-17 | End: 2022-12-01

## 2022-12-01 RX ORDER — OLOPATADINE HYDROCHLORIDE 665 UG/1
0.6 SPRAY, METERED NASAL
Qty: 3 | Refills: 1 | Status: DISCONTINUED | COMMUNITY
Start: 2020-11-06 | End: 2022-12-01

## 2022-12-01 RX ORDER — LABETALOL HYDROCHLORIDE 100 MG/1
100 TABLET, FILM COATED ORAL
Qty: 60 | Refills: 3 | Status: DISCONTINUED | COMMUNITY
Start: 2020-12-08 | End: 2022-12-01

## 2022-12-01 RX ORDER — HYOSCYAMINE SULFATE 0.12 MG/1
0.12 TABLET, CHEWABLE ORAL TWICE DAILY
Qty: 60 | Refills: 5 | Status: DISCONTINUED | COMMUNITY
Start: 2017-03-02 | End: 2022-12-01

## 2022-12-01 RX ORDER — FAMOTIDINE 40 MG/1
40 TABLET, FILM COATED ORAL
Qty: 90 | Refills: 1 | Status: DISCONTINUED | COMMUNITY
Start: 2020-05-04 | End: 2022-12-01

## 2022-12-01 RX ORDER — LOTEPREDNOL ETABONATE 3.8 MG/G
0.38 GEL OPHTHALMIC
Qty: 5 | Refills: 0 | Status: DISCONTINUED | COMMUNITY
Start: 2020-10-14 | End: 2022-12-01

## 2022-12-01 RX ORDER — HYOSCYAMINE SULFATE 0.12 MG/1
0.12 TABLET ORAL DAILY
Refills: 0 | Status: DISCONTINUED | COMMUNITY
Start: 2020-12-08 | End: 2022-12-01

## 2022-12-01 RX ORDER — OLOPATADINE HYDROCHLORIDE 2 MG/ML
0.2 SOLUTION OPHTHALMIC
Qty: 2 | Refills: 0 | Status: DISCONTINUED | COMMUNITY
Start: 2020-09-18 | End: 2022-12-01

## 2022-12-01 RX ORDER — IPRATROPIUM BROMIDE 42 UG/1
0.06 SPRAY NASAL
Qty: 15 | Refills: 0 | Status: DISCONTINUED | COMMUNITY
Start: 2020-10-01 | End: 2022-12-01

## 2022-12-01 RX ORDER — HYDROCORTISONE 25 MG/G
2.5 OINTMENT TOPICAL
Qty: 28 | Refills: 0 | Status: DISCONTINUED | COMMUNITY
Start: 2020-11-16 | End: 2022-12-01

## 2023-02-23 NOTE — PHYSICAL EXAM
[Appropriately responsive] : appropriately responsive [Alert] : alert [No Acute Distress] : no acute distress [No Lymphadenopathy] : no lymphadenopathy [Examination Of The Breasts] : a normal appearance [No Masses] : no breast masses were palpable [Vulvar Atrophy] : vulvar atrophy [Labia Majora] : normal [Labia Minora] : normal [Normal] : normal [Uterine Adnexae] : normal

## 2023-03-02 ENCOUNTER — APPOINTMENT (OUTPATIENT)
Dept: OBGYN | Facility: CLINIC | Age: 83
End: 2023-03-02

## 2023-03-20 ENCOUNTER — APPOINTMENT (OUTPATIENT)
Dept: GASTROENTEROLOGY | Facility: CLINIC | Age: 83
End: 2023-03-20

## 2023-05-24 ENCOUNTER — APPOINTMENT (OUTPATIENT)
Dept: PULMONOLOGY | Facility: CLINIC | Age: 83
End: 2023-05-24
Payer: MEDICARE

## 2023-05-24 VITALS
HEIGHT: 63 IN | OXYGEN SATURATION: 95 % | DIASTOLIC BLOOD PRESSURE: 64 MMHG | WEIGHT: 125 LBS | HEART RATE: 64 BPM | SYSTOLIC BLOOD PRESSURE: 124 MMHG | BODY MASS INDEX: 22.15 KG/M2 | TEMPERATURE: 97.1 F | RESPIRATION RATE: 15 BRPM

## 2023-05-24 DIAGNOSIS — J45.909 UNSPECIFIED ASTHMA, UNCOMPLICATED: ICD-10-CM

## 2023-05-24 DIAGNOSIS — R26.89 OTHER ABNORMALITIES OF GAIT AND MOBILITY: ICD-10-CM

## 2023-05-24 DIAGNOSIS — Z72.820 SLEEP DEPRIVATION: ICD-10-CM

## 2023-05-24 PROCEDURE — 94727 GAS DIL/WSHOT DETER LNG VOL: CPT

## 2023-05-24 PROCEDURE — 99214 OFFICE O/P EST MOD 30 MIN: CPT | Mod: 25

## 2023-05-24 PROCEDURE — 95012 NITRIC OXIDE EXP GAS DETER: CPT

## 2023-05-24 PROCEDURE — 94010 BREATHING CAPACITY TEST: CPT

## 2023-05-24 PROCEDURE — 94729 DIFFUSING CAPACITY: CPT

## 2023-05-24 PROCEDURE — 94618 PULMONARY STRESS TESTING: CPT

## 2023-05-24 RX ORDER — FLUTICASONE PROPIONATE AND SALMETEROL 250; 50 UG/1; UG/1
250-50 POWDER RESPIRATORY (INHALATION)
Qty: 3 | Refills: 1 | Status: ACTIVE | COMMUNITY
Start: 2023-05-24 | End: 1900-01-01

## 2023-05-24 RX ORDER — AZELASTINE HYDROCHLORIDE AND FLUTICASONE PROPIONATE 137; 50 UG/1; UG/1
137-50 SPRAY, METERED NASAL
Qty: 3 | Refills: 1 | Status: ACTIVE | COMMUNITY
Start: 2023-05-24 | End: 1900-01-01

## 2023-05-24 NOTE — HISTORY OF PRESENT ILLNESS
[TextBox_4] : Ms. EZEQUIEL VERNON is a 82 year old female coming into the office for an initial evaluation. Her chief complaint is\par -she notes having pnd constantly \par -she notes coughing \par -she notes producing mucous \par -she notes some itchy eyes sometimes \par -she notes having reflux \par -she notes wanting to put shingles lotion on her body parts \par -she notes seeing Rodrick Boyd \par \par - She  denies any visual issues, headaches, nausea, vomiting, fever, chills, sweats, chest pains, chest pressure, diarrhea, constipation, dysphagia, myalgia, dizziness, leg swelling, leg pain, itchy eyes, itchy ears, heartburn, reflux, or sour taste in the mouth.\par

## 2023-05-24 NOTE — ASSESSMENT
[FreeTextEntry1] : Ms. EZEQUIEL VERNON is a 80 year old female who has a history of Chondromalacia, diverticulosis, GERD, Elevated Cholesterol, IBS, osteoporosis, aortic stenosis, Non-smoker who now comes in for pulmonary evaluation for SOB, Chronic cough, GERD, and Post Nasal Drip Syndrome. RADS/ allergy sx \par \par The patient's SOB is felt to be multifactorial:\par -pulmonary \par    -Mild Asthma \par - Poor breathing mechanics (Poor balance) \par - Cardiac Disease - ?progression of Aortic Stenosis. \par -?Anemia \par \par \par Problem 1: Cardiac Disease \par -?Progression of Aortic Stenosis. \par -Recommended to f/u with the Cardiologist . \par -Recommended cardiologist for Aortic valve  \par \par \par Problem 2: Mild Asthma (active) \par -s//p PFTs to follow c/w obstructive \par -add Ventolin 2 puffs Q6H, pre-exercise \par -Add Generic Advair (250) 1 inhalation BID \par -add Singulair 10 mg QHS \par \par -Inhaler technique reviewed as well as oral hygiene techniques reviewed with patient. Avoidance of cold air, extremes of temperature, rescue inhaler should be used before exercise. Order of medication reviewed with patient. Recommended use of a cool mist humidifier in the bedroom. \par \par -Asthma is believed to be caused by inherited (genetic) and environmental factor, but its exact cause is unknown. Asthma may be triggered by allergens, lung infections, or irritants in the air. Asthma triggers are different for each person \par \par Problem 3: Chronic Cough \par The Cough is felt to be multifactorial\par -asthma \par -allergies and PND \par -GERD\par -Congestive Heart Failure/ Aortic Stenosis\par \par -Any cough greater than three weeks duration-differential diagnosis includes-asthma, upper airway cough syndrome, post nasal drip syndrome, gastroesophageal reflux, laryngopharyngeal reflux, cardiac disease (congestive heart failure, medicines, effects, etc), medication effects (b-blockers, ace inhibitors, ARBs, glaucoma meds, etc.), smoking, infectious, multifactorial, etc. \par \par Problem 4:allergies and PND \par -add Dymista 1 sniff each nostril BID \par -s/p blood work for asthma profile (+), food IgE panel(+), eosinophil level(-), IgE level, Vitamin D level (low)\par \par -Environmental measures for allergies were encouraged including mattress and pillow cover, air purifier, and environmental controls. \par \par Problem 4A: low vitamin d\par - on rx\par Low vitamin D levels have been associated with asthma exacerbations and increased allergic symptoms. The goal based on recent information is maintaining levels between 50-70 and low normal is 30. Recommended 50,000 units every two weeks to once a month depending on the level. \par \par Problem 5: GERD (active) \par -Add Omeprazole (40mg) every morning (qAM) before breakfast \par -Add Pepcid 40mg QHS \par \par - Things to avoid including overeating, spicy foods, tight clothing, eating within three hours of bed, this list is not all inclusive.\par \par - For treatment of reflux, possible options discussed including diet control, H2 blockers, PPIs, as well as coating motility agents discussed as treatment options. Timing of meals and proximity of last meal to sleep were discussed. If symptoms persist, a formal gastrointestinal evaluation is needed. \par \par Problem 5a: Dysphonia\par - get ENT f/p - et al \par \par Problem 6: Hemoptysis (resolved) \par -related to Congestive Heart Failure \par -less endobronchial abnormalities \par -quantify the amount \par -Recommended to f/u with Cardiologist dr. De \par -Check BNP. \par -CT was normal.\par \par Problem 7: ?EVERTON\par -Complete Home Sleep Study. \par -Sleep apnea is associated with adverse clinical consequences which an affect most organ systems. Cardiovascular disease risk includes arrhythmias, atrial fibrillation, hypertension, coronary artery disease, and stroke. Metabolic disorders include diabetes type 2, non-alcoholic fatty liver disease. Mood disorder especially depression; and cognitive decline especially in the elderly. Associations with chronic reflux/Mcnair’s esophagus some but not all inclusive. \par -Reasons include arousal consistent with hypopnea; respiratory events most prominent in REM sleep or supine position; therefore sleep staging and body position are important for accurate diagnosis and estimation of AHI. \par \par Problem 8: ?Anemia \par -Complete Blood Tests: CBC and iron studies. \par \par Problem 9: Poor Breathing Mechanics\par - Proper breathing techniques were reviewed with an emphasis of exhalation. Patient instructed to breath in for 1 second and out for four seconds. Patient was encouraged to not talk while walking.\par \par Problem 10: Health maintenance\par -s/p flu shot 2022\par -recommended strep pneumonia vaccines: Prevnar-13 vaccine (12.28.2020), followed by Pneumo vaccine 23 one year following (after the age of 65) (completed) \par -recommended early intervention for URIs\par -recommended regular osteoporosis evaluations\par -recommended early dermatological evaluations\par -recommended after the age of 50 to the age of 70, colonoscopy every 5 years\par \par f/u in 6-8 weeks\par pt is encouraged to call or fax the office with any questions or concerns.

## 2023-05-24 NOTE — PROCEDURE
[FreeTextEntry1] : Full PFT reveals mild obstructive flows; FEV1 was  1.40L which is 85% of predicted; normal lung volumes; normal diffusion at 11.8, which is 76% of predicted; normal flow volume loop.\par PFTs were performed to evaluate for sob and asthma \par \par 6 minute walk test reveals a low saturation of 98% with no evidence of dyspnea or fatigue; walked 228.2 meters \par \par FENO was  12; a normal value being less than 25\par Fractional exhaled nitric oxide (FENO) is regarded as a simple, noninvasive method for assessing eosinophilic airway inflammation. Produced by a variety of cells within the lung, nitric oxide (NO) concentrations are generally low in healthy individuals. However, high concentrations of NO appear to be involved in nonspecific host defense mechanisms and chronic inflammatory diseases such as asthma. The American Thoracic Society (ATS) therefore has recommended using FENO to aid in the diagnosis and monitoring of eosinophilic airway inflammation and asthma, and for identifying steroid responsive individuals whose chronic respiratory symptoms may be caused by airway inflammation.

## 2023-05-24 NOTE — ADDENDUM
[FreeTextEntry1] : Documented by Davis Pritchard acting as a scribe for Dr. Rogelio Almaraz on 05/24/2023 .\par \par All medical record entries made by the Scribe were at my, Dr. Rogelio Almaraz's, direction and personally dictated by me on 05/24/2023. I have reviewed the chart and agree that the record accurately reflects my personal performance of the history, physical exam, assessment and plan. I have also personally directed, reviewed, and agree with the discharge instructions.

## 2023-05-24 NOTE — PHYSICAL EXAM
[No Acute Distress] : no acute distress [Normal Oropharynx] : normal oropharynx [Normal Appearance] : normal appearance [No Neck Mass] : no neck mass [Normal Rate/Rhythm] : normal rate/rhythm [No Resp Distress] : no resp distress [Clear to Auscultation Bilaterally] : clear to auscultation bilaterally [Kyphosis] : kyphosis [Benign] : benign [Normal Gait] : normal gait [No Clubbing] : no clubbing [No Cyanosis] : no cyanosis [No Edema] : no edema [FROM] : FROM [Normal Color/ Pigmentation] : normal color/ pigmentation [No Focal Deficits] : no focal deficits [Oriented x3] : oriented x3 [Normal Affect] : normal affect [II] : Mallampati Class: II [TextBox_68] : I:E 1:3, clear

## 2023-07-22 NOTE — HISTORY OF PRESENT ILLNESS
[FreeTextEntry1] : 80 y/o presents for follow up for likely shingles on her buttocks and vulva. She was seen in office on 7/20/22, viral culture does not detect HSV and patient has now completed the course of valtrex 1g PO TID x7 days. She reports lesions on her buttocks have improved but on her vulva is still painful. She never used the lidocaine topical cream because the pharmacist said not to. She has not yet followed up with her PCP. Takes tylenol with minimal relief. Reports episodes of urinary incontinence as well. 
I have reviewed and confirmed nurses' notes...

## 2023-11-17 NOTE — PHYSICAL EXAM
Sleep study result faxed to Dr Krunal Powell, [address] [facility] (fax [phone]), per request. [FreeTextEntry1] : right vulvar area extending back appears erythematous with ulcerative lesions upper labia minora area [FreeTextEntry9] : right buttocks with erythematous rash, no clear ulcers, extends into vulvar erythema

## 2023-12-01 ENCOUNTER — APPOINTMENT (OUTPATIENT)
Dept: PULMONOLOGY | Facility: CLINIC | Age: 83
End: 2023-12-01
Payer: MEDICARE

## 2023-12-01 VITALS
TEMPERATURE: 97.6 F | HEIGHT: 63 IN | RESPIRATION RATE: 16 BRPM | WEIGHT: 125 LBS | HEART RATE: 68 BPM | OXYGEN SATURATION: 96 % | SYSTOLIC BLOOD PRESSURE: 122 MMHG | BODY MASS INDEX: 22.15 KG/M2 | DIASTOLIC BLOOD PRESSURE: 70 MMHG

## 2023-12-01 DIAGNOSIS — R04.2 HEMOPTYSIS: ICD-10-CM

## 2023-12-01 PROCEDURE — 94010 BREATHING CAPACITY TEST: CPT

## 2023-12-01 PROCEDURE — 99214 OFFICE O/P EST MOD 30 MIN: CPT | Mod: 25

## 2023-12-01 RX ORDER — FAMOTIDINE 40 MG/1
40 TABLET, FILM COATED ORAL
Qty: 90 | Refills: 1 | Status: ACTIVE | COMMUNITY
Start: 2023-05-24 | End: 1900-01-01

## 2023-12-01 RX ORDER — FAMOTIDINE 20 MG/1
20 TABLET, FILM COATED ORAL AT BEDTIME
Qty: 30 | Refills: 3 | Status: DISCONTINUED | OUTPATIENT
Start: 2022-04-07 | End: 2023-12-01

## 2023-12-01 RX ORDER — MONTELUKAST 10 MG/1
10 TABLET, FILM COATED ORAL
Qty: 1 | Refills: 1 | Status: ACTIVE | COMMUNITY
Start: 2020-12-28 | End: 1900-01-01

## 2024-06-14 ENCOUNTER — APPOINTMENT (OUTPATIENT)
Dept: OBGYN | Facility: CLINIC | Age: 84
End: 2024-06-14
Payer: MEDICARE

## 2024-06-14 VITALS
BODY MASS INDEX: 21.43 KG/M2 | SYSTOLIC BLOOD PRESSURE: 112 MMHG | OXYGEN SATURATION: 99 % | WEIGHT: 121 LBS | DIASTOLIC BLOOD PRESSURE: 64 MMHG | HEART RATE: 67 BPM

## 2024-06-14 DIAGNOSIS — N89.8 OTHER SPECIFIED NONINFLAMMATORY DISORDERS OF VAGINA: ICD-10-CM

## 2024-06-14 DIAGNOSIS — R39.9 UNSPECIFIED SYMPTOMS AND SIGNS INVOLVING THE GENITOURINARY SYSTEM: ICD-10-CM

## 2024-06-14 DIAGNOSIS — N95.2 POSTMENOPAUSAL ATROPHIC VAGINITIS: ICD-10-CM

## 2024-06-14 PROCEDURE — 99213 OFFICE O/P EST LOW 20 MIN: CPT

## 2024-06-14 PROCEDURE — 99459 PELVIC EXAMINATION: CPT

## 2024-06-14 NOTE — PHYSICAL EXAM
[Chaperone Present] : A chaperone was present in the examining room during all aspects of the physical examination [[id number]] : A chaperone was present during the pelvic exam. [Appropriately responsive] : appropriately responsive [Alert] : alert [No Acute Distress] : no acute distress [No Lymphadenopathy] : no lymphadenopathy [Regular Rate Rhythm] : regular rate rhythm [No Murmurs] : no murmurs [Clear to Auscultation B/L] : clear to auscultation bilaterally [Soft] : soft [Non-tender] : non-tender [Non-distended] : non-distended [No HSM] : No HSM [No Lesions] : no lesions [No Mass] : no mass [Oriented x3] : oriented x3 [Examination Of The Breasts] : a normal appearance [No Masses] : no breast masses were palpable [Labia Majora] : normal [Labia Minora] : normal [Atrophy] : atrophy [Normal] : normal [Uterine Adnexae] : normal [FreeTextEntry2] : nelly

## 2024-06-14 NOTE — PLAN
[FreeTextEntry1] : 82 y/o F presents here for various gyn complaints:  some UTI sx: some pinch in bladder: u cx sent some pain at urethra some vaginal irritation sx no vaginal bleeding vaginal atrophy noted *pt was concerned about some areas of irritation in introitus but no lesions/ no signs infection explained likely just from atrophy recommend vaginal moisturizers  (note hx of normal pap and neg hpv 2021, not needed anymore)

## 2024-06-14 NOTE — HISTORY OF PRESENT ILLNESS
[Patient reported mammogram was abnormal] : Patient reported mammogram was abnormal [Patient reported breast sonogram was normal] : Patient reported breast sonogram was normal [N] : Patient is not sexually active [Y] : Positive pregnancy history [unknown] : Patient is unsure of the date of her LMP [Previously active] : previously active [Men] : men [No] : No [Patient refuses STI testing] : Patient refuses STI testing [Patient reported PAP Smear was normal] : Patient reported PAP Smear was normal [FreeTextEntry1] : 84 y/o F presents here for various gyn complaints:  some UTI sx some pain at urethra some vaginal irritation sx no vaginal bleeding [Mammogramdate] : 10/23 [BreastSonogramDate] : 10/23 [PapSmeardate] : 2021 [PGxTotal] : 1 [[facility] : [facility]

## 2024-06-17 LAB
BACTERIA UR CULT: NORMAL
CANDIDA VAG CYTO: NOT DETECTED
G VAGINALIS+PREV SP MTYP VAG QL MICRO: NOT DETECTED
T VAGINALIS VAG QL WET PREP: NOT DETECTED

## 2024-06-20 ENCOUNTER — APPOINTMENT (OUTPATIENT)
Dept: PULMONOLOGY | Facility: CLINIC | Age: 84
End: 2024-06-20
Payer: MEDICARE

## 2024-06-20 VITALS
OXYGEN SATURATION: 98 % | WEIGHT: 121 LBS | HEART RATE: 61 BPM | SYSTOLIC BLOOD PRESSURE: 132 MMHG | TEMPERATURE: 97.3 F | BODY MASS INDEX: 21.44 KG/M2 | DIASTOLIC BLOOD PRESSURE: 70 MMHG | RESPIRATION RATE: 16 BRPM | HEIGHT: 63 IN

## 2024-06-20 DIAGNOSIS — J45.991 COUGH VARIANT ASTHMA: ICD-10-CM

## 2024-06-20 DIAGNOSIS — R09.82 POSTNASAL DRIP: ICD-10-CM

## 2024-06-20 DIAGNOSIS — K21.9 GASTRO-ESOPHAGEAL REFLUX DISEASE W/OUT ESOPHAGITIS: ICD-10-CM

## 2024-06-20 DIAGNOSIS — R06.02 SHORTNESS OF BREATH: ICD-10-CM

## 2024-06-20 DIAGNOSIS — J30.9 ALLERGIC RHINITIS, UNSPECIFIED: ICD-10-CM

## 2024-06-20 DIAGNOSIS — R79.89 OTHER SPECIFIED ABNORMAL FINDINGS OF BLOOD CHEMISTRY: ICD-10-CM

## 2024-06-20 DIAGNOSIS — R05.3 CHRONIC COUGH: ICD-10-CM

## 2024-06-20 PROCEDURE — 94010 BREATHING CAPACITY TEST: CPT

## 2024-06-20 PROCEDURE — 95012 NITRIC OXIDE EXP GAS DETER: CPT

## 2024-06-20 PROCEDURE — 99214 OFFICE O/P EST MOD 30 MIN: CPT | Mod: 25

## 2024-06-20 PROCEDURE — ZZZZZ: CPT

## 2024-06-20 PROCEDURE — 94729 DIFFUSING CAPACITY: CPT

## 2024-06-20 PROCEDURE — 94727 GAS DIL/WSHOT DETER LNG VOL: CPT

## 2024-06-20 NOTE — PROCEDURE
[FreeTextEntry1] :  Full PFT reveals mild obstructive dysfunction; FEV1 was 1.28 L which is 76 % of predicted, normal lung volumes, normal diffusions, at 12.3 L which is 76% predicted, normal flow volume loop. PFT's for performed to evaluate for SOB.   FENO was 16; a normal value being less than 25. Fractional exhaled nitric oxide (FENO) is regarded as a simple, noninvasive method for assessing eosinophilic airway inflammation. Produced by a variety of cells within the lung, nitric oxide (NO) concentrations are generally low in healthy individuals. However, high concentrations of NO appear to be involved in nonspecific host defense mechanisms and chronic inflammatory diseases such as asthma. The American Thoracic Society (ATS) therefore has strongly recommended using FENO to aid in the assessment, management, and long-term monitoring of eosinophilic airway inflammation and asthma, and for identifying steroid responsive individuals whose chronic respiratory symptoms may be caused by airway inflammation. In their 2011 clinical practice guideline, the ATS emphasizes the importance of using FENO.

## 2024-06-20 NOTE — PHYSICAL EXAM
[No Acute Distress] : no acute distress [Normal Oropharynx] : normal oropharynx [II] : Mallampati Class: II [Normal Appearance] : normal appearance [No Neck Mass] : no neck mass [Normal Rate/Rhythm] : normal rate/rhythm [No Resp Distress] : no resp distress [Clear to Auscultation Bilaterally] : clear to auscultation bilaterally [Kyphosis] : kyphosis [Benign] : benign [Normal Gait] : normal gait [No Clubbing] : no clubbing [No Cyanosis] : no cyanosis [No Edema] : no edema [FROM] : FROM [Normal Color/ Pigmentation] : normal color/ pigmentation [No Focal Deficits] : no focal deficits [Oriented x3] : oriented x3 [Normal Affect] : normal affect [TextBox_68] : I:E 1:3, clear  [TextBox_89] : Left lower quadrant tenderness [TextBox_99] :  LLQ tenderness  LLQ tenderness

## 2024-06-20 NOTE — ADDENDUM
[FreeTextEntry1] :  Documented by Toy Moses acting as a scribe for Dr. Rogelio Almaraz on 06/20/2024 .   All medical record entries made by the Scribe were at my, Dr. Rogelio Almaraz's direction and personally dictated by me on 06/20/2024 . I have reviewed the chart and agree that the record accurately reflects my personal performance of the history, Physical exam, assessment, and plan. I have also personally directed, reviewed, and agree with the discharge instructions.

## 2024-06-20 NOTE — ASSESSMENT
Cardiac Electrophysiology OFFICE Note     Subjective:      Neville Leal is a 78 y.o. patient who presents for follow up of chest pain   She had in the past palpitations, PSVT, PACs, PVCs. This has been less since she increase metoprolol to 25 mg bid     A nuc stress test 3/2021  Defects appear to be breast attenuation but cannot do prone images    Chest CT with coronary calcium scoring in 10/2018 showed RCA score 30, other vessels 0. Last echo in 11/2018 showed normal LVEF with no regional wall motion abnormalities, grade 1 diastolic dysfunction. Stress echo in 04/2013 was normal.       Previous:  Intolerant of diltiazem, switched to metoprolol. Noted dizziness & fatigue with 25 mg po bid dosing, decreased to 12.5 mg po bid & feels she's doing well on this dose. Palpitations x several years. Referred from Dr. Aldair Bravo office. Previous loop monitor significant for sinus tachycardia both with & without LBBB. 1700 PVCs on holter in 2009. Stress echo normal then. Patient Active Problem List   Diagnosis Code    Arthritis M19.90    Hyperlipidemia E78.5    Primary insomnia F51.01    Essential hypertension I10    PSVT (paroxysmal supraventricular tachycardia) ([facility]) I47.1     Current Outpatient Medications   Medication Sig Dispense Refill    atorvastatin (LIPITOR) 10 mg tablet TAKE 1 TABLET DAILY 90 Tablet 3    alendronate (FOSAMAX) 70 mg tablet TAKE 1 TABLET EVERY 7 DAYS (NEEDS AN APPOINTMENT BEFORE NEXT REFILL) 12 Tablet 3    metoprolol tartrate (LOPRESSOR) 25 mg tablet TAKE 1 TABLET TWICE A  Tablet 3    traZODone (DESYREL) 150 mg tablet TAKE 1 TABLET NIGHTLY (NEED APPOINTMENT FOR ADDITIONAL REFILLS FOR CHRONIC MEDICAL CARE MANAGEMENT) 90 Tablet 3    diclofenac (VOLTAREN) 1 % gel APPLY 2GM TO AFFECTED AREA 4 TIMES DAILY FOR 30 DAYS 100 g 1    aspirin delayed-release 81 mg tablet Take 81 mg by mouth daily.  calcium 500 mg Tab Take 1 Tab by mouth daily.       Cholecalciferol, Vitamin D3, (VITAMIN D) 1,000 unit Cap Take 1 Tab by mouth daily.  fish oil-dha-epa (FISH OIL) 1,200-144-216 mg Cap Take 2 Caps by mouth daily. No Known Allergies  Past Medical History:   Diagnosis Date    Arthritis     Hypercholesterolemia     Sun-damaged skin     Sunburn, blistering      Past Surgical History:   Procedure Laterality Date    ENDOSCOPY, COLON, DIAGNOSTIC  3/7/2012/    repeat in 5 years due family hx    HX CATARACT REMOVAL Bilateral     HX GYN          HX ORTHOPAEDIC      rotator cuff tear     Family History   Problem Relation Age of Onset    Hypertension Mother     Stroke Mother     Cancer Father         leukemia     Social History     Tobacco Use    Smoking status: Never Smoker    Smokeless tobacco: Never Used   Substance Use Topics    Alcohol use: Yes     Comment: socially        Review of Systems:   Constitutional: Negative for fever, chills, weight loss, malaise/fatigue. HEENT: Negative for nosebleeds, vision changes. Respiratory: Negative for cough, hemoptysis  Cardiovascular: Negative for  no orthopnea, claudication, leg swelling, syncope, and PND. Gastrointestinal: Negative for nausea, vomiting, diarrhea, blood in stool and melena. Genitourinary: Negative for dysuria, and hematuria. Musculoskeletal: Negative for myalgias, arthralgia. Skin: Negative for rash. Heme: Does not bleed or bruise easily. Neurological: Negative for speech change and focal weakness. Objective:     Visit Vitals  /78 (BP 1 Location: Right arm, BP Patient Position: Sitting, BP Cuff Size: Adult)   Pulse 63   Resp 16   Ht 5' 2\" (1.575 m)   Wt 133 lb (60.3 kg)   SpO2 98%   BMI 24.33 kg/m²     Physical Exam:   Constitutional: Well-developed and well-nourished. No respiratory distress. Head: Normocephalic and atraumatic. Eyes: Pupils are equal, round. ENT: Hearing grossly normal.  Neck: Supple. No JVD present.    Cardiovascular: Normal rate, [FreeTextEntry1] : Ms. EZEQUIEL VERNON is a 83 year old female who has a history of Chondromalacia, diverticulosis, GERD, Elevated Cholesterol, IBS, osteoporosis, aortic stenosis, Non-smoker who now comes in for pulmonary evaluation for SOB, Chronic cough, GERD, and Post Nasal Drip Syndrome. RADS/ allergy sx- #1 issue is "Bloating /Constipation"   The patient's SOB is felt to be multifactorial: -pulmonary  -Mild Asthma - Poor breathing mechanics (Poor balance) - Cardiac Disease - ?progression of Aortic Stenosis. -?Anemia  Problem 1: Cardiac Disease -?Progression of Aortic Stenosis. -Recommended to f/u with the Cardiologist . -Recommended cardiologist for Aortic valve   Problem 2: Mild Asthma (quiet) -s//p PFTs to follow c/w obstructive -add Ventolin 2 puffs Q6H, pre-exercise -Add Generic Advair (250) 1 inhalation BID -add Singulair 10 mg QHS  -Inhaler technique reviewed as well as oral hygiene techniques reviewed with patient. Avoidance of cold air, extremes of temperature, rescue inhaler should be used before exercise. Order of medication reviewed with patient. Recommended use of a cool mist humidifier in the bedroom. -Asthma is believed to be caused by inherited (genetic) and environmental factor, but its exact cause is unknown. Asthma may be triggered by allergens, lung infections, or irritants in the air. Asthma triggers are different for each person  Problem 3: Chronic Cough  -Add NAC 900mg BID The Cough is felt to be multifactorial -asthma -allergies and PND -GERD -Congestive Heart Failure/ Aortic Stenosis -Any cough greater than three weeks duration-differential diagnosis includes-asthma, upper airway cough syndrome, post nasal drip syndrome, gastroesophageal reflux, laryngopharyngeal reflux, cardiac disease (congestive heart failure, medicines, effects, etc), medication effects (b-blockers, ace inhibitors, ARBs, glaucoma meds, etc.), smoking, infectious, multifactorial, etc.  Problem 4:allergies and PND -add Dymista 1 sniff each nostril BID -s/p blood work for asthma profile (+), food IgE panel(+), eosinophil level(-), IgE level, Vitamin D level (low) -Environmental measures for allergies were encouraged including mattress and pillow cover, air purifier, and environmental controls.    Problem 4A: low vitamin d - on rx Low vitamin D levels have been associated with asthma exacerbations and increased allergic symptoms. The goal based on recent information is maintaining levels between 50-70 and low normal is 30. Recommended 50,000 units every two weeks to once a month depending on the level.   Problem 5: GERD (active) -recommend DFH-Digestive Enzymes -recommended Reflux Gourmet -recommended regularity guard  -Add Omeprazole (40mg) every morning (qAM) before breakfast -Add Pepcid 40mg QHS - Things to avoid including overeating, spicy foods, tight clothing, eating within three hours of bed, this list is not all inclusive. - For treatment of reflux, possible options discussed including diet control, H2 blockers, PPIs, as well as coating motility agents discussed as treatment options. Timing of meals and proximity of last meal to sleep were discussed. If symptoms persist, a formal gastrointestinal evaluation is needed.   Problem 5a: Dysphonia -ENT f/p - et al pan  Problem 6: Hemoptysis (resolved) -related to Congestive Heart Failure -less endobronchial abnormalities -quantify the amount -Recommended to f/u with Cardiologist dr. De -Check BNP. -CT was normal.  Problem 7: ?EVERTON -Complete Home Sleep Study. -Sleep apnea is associated with adverse clinical consequences which an affect most organ systems. Cardiovascular disease risk includes arrhythmias, atrial fibrillation, hypertension, coronary artery disease, and stroke. Metabolic disorders include diabetes type 2, non-alcoholic fatty liver disease. Mood disorder especially depression; and cognitive decline especially in the elderly. Associations with chronic reflux/Mcnair's esophagus some but not all inclusive. -Reasons include arousal consistent with hypopnea; respiratory events most prominent in REM sleep or supine position; therefore sleep staging and body position are important for accurate diagnosis and estimation of AHI.  Problem 8: ?Anemia ([place]) -Complete Blood Tests: CBC and iron studies.  Problem 9: Poor Breathing Mechanics - Proper breathing techniques were reviewed with an emphasis of exhalation. Patient instructed to breath in for 1 second and out for four seconds. Patient was encouraged to not talk while walking.  Problem 10: Health maintenance -s/p flu shot 2022 -recommended strep pneumonia vaccines: Prevnar-13 vaccine (12.28.2020), followed by Pneumo vaccine 23 one year following (after the age of 65) (completed) -recommended early intervention for URIs -recommended regular osteoporosis evaluations -recommended early dermatological evaluations -recommended after the age of 50 to the age of 70, colonoscopy every 5 years  f/u in 3-6 months  pt is encouraged to call or fax the office with any questions or concerns.   regular rhythm. Exam reveals no gallop and no friction rub. No murmur heard. Pulmonary/Chest: Effort normal and breath sounds normal. No wheezes. Abdominal: Soft, no tenderness. Musculoskeletal: Moves extremities independently. Normal gait. Vasc/lymphatic: No edema. Neurological: Alert,oriented. Skin: Skin is warm and dry. Psychiatric: normal mood and affect. Behavior is normal. Judgment and thought content normal.        Assessment/Plan:       ICD-10-CM ICD-9-CM    1. PSVT (paroxysmal supraventricular tachycardia) (HCC)  I47.1 427.0    2. Essential hypertension  I10 401.9    3. PVC (premature ventricular contraction)  I49.3 427.69    4. PAC (premature atrial contraction)  I49.1 427.61          Imaging/Studies:  Echo (11/06/2018): LVEF 59%, no RWMA, grade 1 diastolic dysfunction. Prominent Chiari network in RA. Trivial TR. Trivial MI. Loop monitor (10/2018): Few PACs, 50 PVCs, & single 7 beat run PSVT. A nuc stress test 3/2021  Defects appear to be breast attenuation but cannot do prone images       Ms. Leydi Rainey has chest pain that is resolved and she did not appear to have ischemia  I recommend and discussed with her about cardiac cath if she has angina  She agrees that she does not     BP is controlled    PSVT/PA /PVC controlled with metoprolol    Will continue to monitor arrhythmia         Future Appointments   Date Time Provider Yoli Dozier   3/7/2023  2:20 PM MD JEN Abel AMB      Thank you for involving me in this patient's care and please call with further concerns or questions. Deny Blackman M.D.   Electrophysiology/Cardiology  [facility] [place]  [address] [place] [address]                               [phone]

## 2024-06-20 NOTE — HISTORY OF PRESENT ILLNESS
[TextBox_4] : Ms. EZEQUIEL VERNON is a 83 year old female coming into the office for an initial evaluation. Her chief complaint is  -she notes as if she is going to get a cold she notes some constipation and is taking Metamucil but doesn't seem to notice improvement -she notes chronic bloating and gastric pain -she notes only sleeping 5 hours (12 AM-4-5 AM) -she notes she has heartburn every other day -she notes she cannot digest avocados -she notes some right sided back pain near her waist region -she notes her ankle swelling has improved -she notes poor appetite -she notes breathing is stable -she notes her throat is very sensitive -she notes sucking a Ricola due to helping with the cough    -Patient denies any headaches, nausea, vomiting, fever, chills, sweats, chest pain, chest pressure, palpitations, wheezing, fatigue, diarrhea, dysphagia, arthralgias, dizziness, leg swelling, leg pain, itchy eyes, itchy ears, dysphonia or sour taste in mouth.

## 2024-08-06 ENCOUNTER — RX CHANGE (OUTPATIENT)
Age: 84
End: 2024-08-06

## 2024-08-06 ENCOUNTER — APPOINTMENT (OUTPATIENT)
Dept: PULMONOLOGY | Facility: CLINIC | Age: 84
End: 2024-08-06

## 2024-08-06 PROCEDURE — 71046 X-RAY EXAM CHEST 2 VIEWS: CPT | Mod: 26

## 2024-08-06 PROCEDURE — 99215 OFFICE O/P EST HI 40 MIN: CPT

## 2024-08-06 NOTE — PHYSICAL EXAM
[No Acute Distress] : no acute distress [Normal Oropharynx] : normal oropharynx [II] : Mallampati Class: II [Normal Appearance] : normal appearance [No Neck Mass] : no neck mass [Normal Rate/Rhythm] : normal rate/rhythm [No Resp Distress] : no resp distress [Benign] : benign [Normal Gait] : normal gait [FROM] : FROM [Normal Color/ Pigmentation] : normal color/ pigmentation [No Focal Deficits] : no focal deficits [Oriented x3] : oriented x3 [Normal Affect] : normal affect [Wheeze] : wheeze [Scoliosis] : scoliosis [TextBox_99] :   JENNIFER tenderness

## 2024-08-06 NOTE — PROCEDURE
[FreeTextEntry1] : TODAY'S VISIT 8/6/2024 CXRAY read by Dr. Almaraz Scoliosis calcified cartilage otherwise  normal  LAST VISIT 6/20/2024 Full PFT reveals mild obstructive dysfunction; FEV1 was 1.28 L which is 76 % of predicted, normal lung volumes, normal diffusions, at 12.3 L which is 76% predicted, normal flow volume loop. PFT's for performed to evaluate for SOB.   FENO was 16; a normal value being less than 25. Fractional exhaled nitric oxide (FENO) is regarded as a simple, noninvasive method for assessing eosinophilic airway inflammation. Produced by a variety of cells within the lung, nitric oxide (NO) concentrations are generally low in healthy individuals. However, high concentrations of NO appear to be involved in nonspecific host defense mechanisms and chronic inflammatory diseases such as asthma. The American Thoracic Society (ATS) therefore has strongly recommended using FENO to aid in the assessment, management, and long-term monitoring of eosinophilic airway inflammation and asthma, and for identifying steroid responsive individuals whose chronic respiratory symptoms may be caused by airway inflammation. In their 2011 clinical practice guideline, the ATS emphasizes the importance of using FENO.

## 2024-08-06 NOTE — REVIEW OF SYSTEMS
[Negative] : Endocrine [Fatigue] : fatigue [Cough] : cough [Chest Tightness] : chest tightness [Dyspnea] : dyspnea [Seasonal Allergies] : seasonal allergies [Back Pain] : back pain [Fracture] : fracture [TextBox_94] : spontaneous fx age 59

## 2024-08-06 NOTE — HISTORY OF PRESENT ILLNESS
[TextBox_4] : TODAY'S VISIT 8/6/2024- SICK VISIT Ms. VERNON is being seen for for Hemoptysis, SOB, ?Anemia, Allergies, PND, Chronic Cough, Mild Asthma.  Krysten Phillips present during visit to offer HPI  -reports sob, fatigue, sore throat x 9 days; taking tylenol and ricola -reports not using maintenance inhalers -reports she has reflux that is controlled with Pantoprazole and Famotidine -reports has sinus congestion; not using nasal sprays -reports cough- with minimal production  -reports she has osteoporosis; gets Reclast infusions annually.  denies any headaches, nausea, vomiting, chills, sweats, chest pain, chest pressure, palpitations, constipation, dysphagia, dizziness, leg swelling, leg pain, itchy eyes, itchy ears, heartburn, reflux or sour taste in the mouth.  LAST VISIT 6/20/2024 Ms. EZEQUIEL VERNON is a 83 year old female coming into the office for an initial evaluation. Her chief complaint is  -she notes as if she is going to get a cold she notes some constipation and is taking Metamucil but doesn't seem to notice improvement -she notes chronic bloating and gastric pain -she notes only sleeping 5 hours (12 AM-4-5 AM) -she notes she has heartburn every other day -she notes she cannot digest avocados -she notes some right sided back pain near her waist region -she notes her ankle swelling has improved -she notes poor appetite -she notes breathing is stable -she notes her throat is very sensitive -she notes sucking a Ricola due to helping with the cough    -Patient denies any headaches, nausea, vomiting, fever, chills, sweats, chest pain, chest pressure, palpitations, wheezing, fatigue, diarrhea, dysphagia, arthralgias, dizziness, leg swelling, leg pain, itchy eyes, itchy ears, dysphonia or sour taste in mouth.

## 2024-08-06 NOTE — ASSESSMENT
[FreeTextEntry1] : Ms. EZEQUIEL VERNON is a 83 year old female who has a history of Chondromalacia, diverticulosis, GERD, Elevated Cholesterol, IBS, osteoporosis, aortic stenosis, Non-smoker, SOB, Chronic cough, GERD, and Post Nasal Drip Syndrome. RADS/ allergy  Presents for acute visit accompanied by elizarKrysten  The patient's SOB is felt to be multifactorial: - ?respiratory infection  -Mild Asthma (active) - Poor breathing mechanics (Poor balance) - Cardiac Disease - ?progression of Aortic Stenosis. -?Anemia  Problem 1: Asthmatic bronchitis (abnormal PFT c/w obstructive) -Add Prednisone 20 mg x 7 days, 10 mg x 7 days. take with food in AM -Add Albuterol neb BID up to every 6 hours PRN; ordered nebulizer -Add Advair (230) 1 inhalation BID (rinse and gargle) ([place]). Impressed upon patient need to use maintenance inhaler -continue Singulair 10 mg QHS -add Ventolin 2 puffs Q6H, pre-exercise  -Inhaler technique reviewed as well as oral hygiene techniques reviewed with patient. Avoidance of cold air, extremes of temperature, rescue inhaler should be used before exercise. Order of medication reviewed with patient. Recommended use of a cool mist humidifier in the bedroom. -Asthma is believed to be caused by inherited (genetic) and environmental factor, but its exact cause is unknown. Asthma may be triggered by allergens, lung infections, or irritants in the air. Asthma triggers are different for each person  Problem 1a: SOB/cough r/t asthma exacerbation vs infection -Ordered RVP -Ordered sputum culture and sensitivity  Problem 1b: Sore throat  -Recommended salt water gargle -Recommended tea with lemon and honey  Problem 2:allergies and PND -add Dymista 1 sniff each nostril BID (NC) -s/p blood work for asthma profile (+), food IgE panel(+), eosinophil level(-), IgE level, Vitamin D level (low) -Environmental measures for allergies were encouraged including mattress and pillow cover, air purifier, and environmental controls.  Problem 3: low vitamin d - on rx Low vitamin D levels have been associated with asthma exacerbations and increased allergic symptoms. The goal based on recent information is maintaining levels between 50-70 and low normal is 30. Recommended 50,000 units every two weeks to once a month depending on the level.  Problem 4: GERD  -continue Pantoprazole (40mg) every morning (qAM) before breakfast  -continue Pepcid 40mg QHS -recommend DFH-Digestive Enzymes -recommended Reflux Gourmet -recommended regularity guard   - Things to avoid including overeating, spicy foods, tight clothing, eating within three hours of bed, this list is not all inclusive. - For treatment of reflux, possible options discussed including diet control, H2 blockers, PPIs, as well as coating motility agents discussed as treatment options. Timing of meals and proximity of last meal to sleep were discussed. If symptoms persist, a formal gastrointestinal evaluation is needed.  f/u with NP in 1 month pt is encouraged to call office with any questions or concerns.

## 2024-08-15 RX ORDER — FLUTICASONE PROPIONATE 50 UG/1
50 SPRAY, METERED NASAL TWICE DAILY
Qty: 3 | Refills: 1 | Status: ACTIVE | COMMUNITY
Start: 2024-08-15 | End: 1900-01-01

## 2024-08-15 RX ORDER — AZELASTINE HYDROCHLORIDE 137 UG/1
137 SPRAY, METERED NASAL TWICE DAILY
Qty: 3 | Refills: 1 | Status: ACTIVE | COMMUNITY
Start: 2024-08-15 | End: 1900-01-01

## 2024-08-28 ENCOUNTER — APPOINTMENT (OUTPATIENT)
Dept: PULMONOLOGY | Facility: CLINIC | Age: 84
End: 2024-08-28
Payer: MEDICARE

## 2024-08-28 VITALS
HEART RATE: 67 BPM | HEIGHT: 63 IN | BODY MASS INDEX: 21.44 KG/M2 | SYSTOLIC BLOOD PRESSURE: 140 MMHG | TEMPERATURE: 97.2 F | RESPIRATION RATE: 16 BRPM | DIASTOLIC BLOOD PRESSURE: 62 MMHG | WEIGHT: 121 LBS | OXYGEN SATURATION: 97 %

## 2024-08-28 DIAGNOSIS — K59.00 CONSTIPATION, UNSPECIFIED: ICD-10-CM

## 2024-08-28 DIAGNOSIS — J30.9 ALLERGIC RHINITIS, UNSPECIFIED: ICD-10-CM

## 2024-08-28 DIAGNOSIS — R79.89 OTHER SPECIFIED ABNORMAL FINDINGS OF BLOOD CHEMISTRY: ICD-10-CM

## 2024-08-28 DIAGNOSIS — R09.82 POSTNASAL DRIP: ICD-10-CM

## 2024-08-28 DIAGNOSIS — K21.9 GASTRO-ESOPHAGEAL REFLUX DISEASE W/OUT ESOPHAGITIS: ICD-10-CM

## 2024-08-28 DIAGNOSIS — J45.991 COUGH VARIANT ASTHMA: ICD-10-CM

## 2024-08-28 PROCEDURE — 99215 OFFICE O/P EST HI 40 MIN: CPT

## 2024-08-28 PROCEDURE — G2211 COMPLEX E/M VISIT ADD ON: CPT

## 2024-08-28 RX ORDER — INHALER, ASSIST DEVICES
SPACER (EA) MISCELLANEOUS
Qty: 1 | Refills: 0 | Status: ACTIVE | COMMUNITY
Start: 2024-08-28 | End: 1900-01-01

## 2024-08-28 RX ORDER — AZITHROMYCIN 500 MG/1
500 TABLET, FILM COATED ORAL DAILY
Qty: 7 | Refills: 0 | Status: ACTIVE | COMMUNITY
Start: 2024-08-28 | End: 1900-01-01

## 2024-08-28 NOTE — PHYSICAL EXAM
[No Acute Distress] : no acute distress [Normal Oropharynx] : normal oropharynx [Normal Appearance] : normal appearance [Normal Rate/Rhythm] : normal rate/rhythm [No Resp Distress] : no resp distress [Clear to Auscultation Bilaterally] : clear to auscultation bilaterally [Scoliosis] : scoliosis [Soft] : soft [Normal Gait] : normal gait [FROM] : FROM [Normal Color/ Pigmentation] : normal color/ pigmentation [No Focal Deficits] : no focal deficits [Oriented x3] : oriented x3 [Normal Affect] : normal affect [TextBox_89] : tender; hypoactive bowel sounds [TextBox_99] :   JENNIFER tenderness

## 2024-08-28 NOTE — ASSESSMENT
[FreeTextEntry1] : Ms. EZEQUIEL VERNON is a 84 year old female who has a history of Chondromalacia, diverticulosis, GERD, Elevated Cholesterol, IBS, osteoporosis, aortic stenosis, Non-smoker, SOB, Chronic cough, GERD, and Post Nasal Drip Syndrome. RADS/ allergy  Presents for acute visit accompanied by elizarKrysten  The patient's SOB is felt to be multifactorial: - ?respiratory infection  -Mild Asthma (active) - Poor breathing mechanics (Poor balance) - Cardiac Disease - ?progression of Aortic Stenosis. -?Anemia  Problem 1: Asthmatic bronchitis (some improvement) -s/p Prednisone 20 mg x 7 days, 10 mg x 7 days. take with food in AM (8/6/2024) -continue Albuterol neb BID up to every 6 hours PRN; ordered nebulizer -continue Advair (230) 1 inhalation BID (rinse and gargle). Impressed upon patient need to use maintenance inhaler -continue Singulair 10 mg QHS -continue Ventolin 2 puffs Q6H, pre-exercise -Asthma is believed to be caused by inherited (genetic) and environmental factor, but its exact cause is unknown. Asthma may be triggered by allergens, lung infections, or irritants in the air. Asthma triggers are different for each person  Problem 1a: SOB/cough r/t asthma exacerbation vs infection  -Add Azithromycin 500 mg daily x 7 days  (symptoms >3 weeks) -Recommended OTC Probiotic Align while on Abx -s/p RVP 8/6/2024 negative -Ordered sputum culture and sensitivity (unable)  Problem 1b: Dysphonia -Add Aerochamber spacer; Use with MDI  Problem 1c: Sore throat  -Recommended salt water gargle -Recommended tea with lemon and honey  Problem 2: Allergies and PND -continue Azelastine 1 sniff each nostril BID (Dymista not covered) -continue Flonase 1 spray BID  -s/p blood work for asthma profile (+), food IgE panel(+), eosinophil level(-), IgE level, Vitamin D level (low) -Environmental measures for allergies were encouraged including mattress and pillow cover, air purifier, and environmental controls.  Problem 3: low vitamin D - on rx Low vitamin D levels have been associated with asthma exacerbations and increased allergic symptoms. The goal based on recent information is maintaining levels between 50-70 and low normal is 30. Recommended 50,000 units every two weeks to once a month depending on the level.  Problem 4: GERD  -continue Pantoprazole (40mg) every morning (qAM) before breakfast  -continue Pepcid 40mg QHS - Things to avoid including overeating, spicy foods, tight clothing, eating within three hours of bed, this list is not all inclusive. - For treatment of reflux, possible options discussed including diet control, H2 blockers, PPIs, as well as coating motility agents discussed as treatment options. Timing of meals and proximity of last meal to sleep were discussed. If symptoms persist, a formal gastrointestinal evaluation is needed.  Problem 5: Constipation -Recommended OTC Konsyl fiber supplement. Take with full glass of water -f/u with PCP  Problem 6: ? UTI (burning sensation, odor) -f/u with PCP  f/u with NP in 1 month; Dr. Almaraz 12/2024 pt is encouraged to call office with any questions or concerns.

## 2024-08-28 NOTE — REVIEW OF SYSTEMS
[Fatigue] : fatigue [Postnasal Drip] : postnasal drip [Cough] : cough [Dyspnea] : dyspnea [Seasonal Allergies] : seasonal allergies [Abdominal Pain] : abdominal pain [Constipation] : constipation [Dysuria] : dysuria [Negative] : Endocrine

## 2024-08-28 NOTE — PROCEDURE
[FreeTextEntry1] :  VISIT 8/6/2024 CXRAY read by Dr. Almaraz Scoliosis calcified cartilage otherwise  normal  LAST VISIT 6/20/2024 Full PFT reveals mild obstructive dysfunction; FEV1 was 1.28 L which is 76 % of predicted, normal lung volumes, normal diffusions, at 12.3 L which is 76% predicted, normal flow volume loop. PFT's for performed to evaluate for SOB.   FENO was 16; a normal value being less than 25. Fractional exhaled nitric oxide (FENO) is regarded as a simple, noninvasive method for assessing eosinophilic airway inflammation. Produced by a variety of cells within the lung, nitric oxide (NO) concentrations are generally low in healthy individuals. However, high concentrations of NO appear to be involved in nonspecific host defense mechanisms and chronic inflammatory diseases such as asthma. The American Thoracic Society (ATS) therefore has strongly recommended using FENO to aid in the assessment, management, and long-term monitoring of eosinophilic airway inflammation and asthma, and for identifying steroid responsive individuals whose chronic respiratory symptoms may be caused by airway inflammation. In their 2011 clinical practice guideline, the ATS emphasizes the importance of using FENO.

## 2024-08-28 NOTE — REASON FOR VISIT
[Acute] : an acute visit [TextBox_44] : Hemoptysis, SOB, ?Anemia, Allergies, PND, Chronic Cough, Mild Asthma

## 2024-08-28 NOTE — HISTORY OF PRESENT ILLNESS
[TextBox_4] : TODAY'S VISIT 8/28/2024- remains acute Ms. VERNON is being seen for for acute visit SOB, ?Anemia, Allergies, PND, Chronic Cough, Mild Asthma. Krysten Phillips present during visit to offer HPI  -reports coughing has improved but still feeling sob, fatigue; s/p prednisone; has been using Advair, nebulizer -reports dysphonia and sore throat -reports nonproductive cough; was unable to produce for sputum culture order placed during last visit -reports has PND; using nasal sprays -reports burning sensation during urination -reports constipation; last BM 2 days ago  denies any headaches, nausea, vomiting, fever, chills, sweats, chest pain, chest pressure, palpitations, dysphagia, dizziness, leg swelling, leg pain, itchy eyes, itchy ears, heartburn, reflux or sour taste in the mouth.  LAST VISIT 8/6/2024- ACUTE VISIT Ms. VERNON is being seen for for Hemoptysis, SOB, ?Anemia, Allergies, PND, Chronic Cough, Mild Asthma.  Krysten Phillips present during visit to offer HPI  -reports sob, fatigue, sore throat x 9 days; taking tylenol and ricola -reports not using maintenance inhalers -reports she has reflux that is controlled with Pantoprazole and Famotidine -reports has sinus congestion; not using nasal sprays -reports cough- with minimal production  -reports she has osteoporosis; gets Reclast infusions annually.  denies any headaches, nausea, vomiting, chills, sweats, chest pain, chest pressure, palpitations, constipation, dysphagia, dizziness, leg swelling, leg pain, itchy eyes, itchy ears, heartburn, reflux or sour taste in the mouth.  LAST VISIT 6/20/2024 Ms. EZEQUIEL VERNON is a 83 year old female coming into the office for an initial evaluation. Her chief complaint is  -she notes as if she is going to get a cold she notes some constipation and is taking Metamucil but doesn't seem to notice improvement -she notes chronic bloating and gastric pain -she notes only sleeping 5 hours (12 AM-4-5 AM) -she notes she has heartburn every other day -she notes she cannot digest avocados -she notes some right sided back pain near her waist region -she notes her ankle swelling has improved -she notes poor appetite -she notes breathing is stable -she notes her throat is very sensitive -she notes sucking a Ricola due to helping with the cough    -Patient denies any headaches, nausea, vomiting, fever, chills, sweats, chest pain, chest pressure, palpitations, wheezing, fatigue, diarrhea, dysphagia, arthralgias, dizziness, leg swelling, leg pain, itchy eyes, itchy ears, dysphonia or sour taste in mouth.

## 2024-09-27 ENCOUNTER — APPOINTMENT (OUTPATIENT)
Dept: PULMONOLOGY | Facility: CLINIC | Age: 84
End: 2024-09-27
Payer: MEDICARE

## 2024-09-27 VITALS
BODY MASS INDEX: 20.73 KG/M2 | HEART RATE: 67 BPM | SYSTOLIC BLOOD PRESSURE: 128 MMHG | HEIGHT: 63 IN | OXYGEN SATURATION: 98 % | WEIGHT: 117 LBS | DIASTOLIC BLOOD PRESSURE: 64 MMHG | TEMPERATURE: 97.8 F | RESPIRATION RATE: 16 BRPM

## 2024-09-27 DIAGNOSIS — J45.909 UNSPECIFIED ASTHMA, UNCOMPLICATED: ICD-10-CM

## 2024-09-27 DIAGNOSIS — J45.991 COUGH VARIANT ASTHMA: ICD-10-CM

## 2024-09-27 DIAGNOSIS — K21.9 GASTRO-ESOPHAGEAL REFLUX DISEASE W/OUT ESOPHAGITIS: ICD-10-CM

## 2024-09-27 DIAGNOSIS — R79.89 OTHER SPECIFIED ABNORMAL FINDINGS OF BLOOD CHEMISTRY: ICD-10-CM

## 2024-09-27 DIAGNOSIS — H61.21 IMPACTED CERUMEN, RIGHT EAR: ICD-10-CM

## 2024-09-27 DIAGNOSIS — J30.9 ALLERGIC RHINITIS, UNSPECIFIED: ICD-10-CM

## 2024-09-27 DIAGNOSIS — R09.82 POSTNASAL DRIP: ICD-10-CM

## 2024-09-27 LAB
24R-OH-CALCIDIOL SERPL-MCNC: 54.8 PG/ML
25(OH)D3 SERPL-MCNC: 28.5 NG/ML

## 2024-09-27 PROCEDURE — 71046 X-RAY EXAM CHEST 2 VIEWS: CPT | Mod: 26

## 2024-09-27 PROCEDURE — G2211 COMPLEX E/M VISIT ADD ON: CPT

## 2024-09-27 PROCEDURE — 99215 OFFICE O/P EST HI 40 MIN: CPT

## 2024-09-27 NOTE — REVIEW OF SYSTEMS
[Fatigue] : fatigue [Ear Disturbance] : ear disturbance [Postnasal Drip] : postnasal drip [Cough] : cough [Negative] : Endocrine [TextBox_14] : whistling from right ear

## 2024-09-27 NOTE — PHYSICAL EXAM
[No Acute Distress] : no acute distress [Normal Rate/Rhythm] : normal rate/rhythm [No Resp Distress] : no resp distress [Clear to Auscultation Bilaterally] : clear to auscultation bilaterally [Scoliosis] : scoliosis [Benign] : benign [Normal Gait] : normal gait [FROM] : FROM [Normal Color/ Pigmentation] : normal color/ pigmentation [No Focal Deficits] : no focal deficits [Oriented x3] : oriented x3 [Normal Affect] : normal affect [TextBox_11] : right ear with bloody earwax [TextBox_44] : submental lymph nodes enlarged [TextBox_99] :   JENNIFER tenderness

## 2024-09-27 NOTE — ASSESSMENT
[FreeTextEntry1] : Ms. EZEQUIEL VERNON is a 84 year old female who has a history of Chondromalacia, diverticulosis, GERD, Elevated Cholesterol, IBS, osteoporosis, aortic stenosis, Non-smoker, SOB, Chronic cough, GERD, and Post Nasal Drip Syndrome. RADS/ allergy  Presents for acute visit  Problem 1:  COVID 19 infection dx 9/12/24 (resolving) -s/p Lagevrio every 12 hours x 5 days (9/12/24) -Recommended OTC Xiao seltzer cold &Flu or Delsym or Mucinex DM  Problem 2: Asthma -Restart Advair (230) 1 inhalation BID (rinse and gargle). Impressed upon patient need to use maintenance inhaler -continue Singulair 10 mg QHS -continue Ventolin 2 puffs Q6H, pre-exercise -continue Albuterol neb BID up to every 6 hours PRN; ordered nebulizer -s/p Prednisone 20 mg x 7 days, 10 mg x 7 days. take with food in AM (8/6/2024) -s/p Azithromycin 500 mg daily x 7 days  (symptoms >3 weeks) (8/28/2024) -s/p RVP 8/6/2024 negative -Ordered sputum culture and sensitivity (unable) -Asthma is believed to be caused by inherited (genetic) and environmental factor, but its exact cause is unknown. Asthma may be triggered by allergens, lung infections, or irritants in the air. Asthma triggers are different for each person  Problem 2a: Dysphonia -continue Aerochamber spacer; Use with MDI. In office education demonstration provided  Problem 3: Allergies and PND -continue Azelastine 1 sniff each nostril BID (Dymista not covered) -continue Flonase 1 spray BID  -Add OTC Zyrtec daily  -s/p blood work for asthma profile (+), food IgE panel(+), eosinophil level(-), IgE level, Vitamin D level (low) -Environmental measures for allergies were encouraged including mattress and pillow cover, air purifier, and environmental controls.  Problem 3a: low vitamin D - on rx. Taking Calcium and Vit D (followed by Dr. Benitez) Low vitamin D levels have been associated with asthma exacerbations and increased allergic symptoms. The goal based on recent information is maintaining levels between 50-70 and low normal is 30. Recommended 50,000 units every two weeks to once a month depending on the level.  Problem 4: Bronchiectasis (seen on CXray in office) -Add Aerobika mucous clearing device 30 reps 2xday  Problem 5: R/O Right ear infection (cerumen right ear) -Referral [facility] for same day appt; may require removal of cerumen  Problem 6: ? Immunodeficiency  -Add Immune panel, Pneumo IgG, Tcell subset, IgG Subsets, ImmunoCap IgE  Problem 7: GERD  -continue Pantoprazole (40mg) every morning (qAM) before breakfast  -continue Pepcid 40mg QHS - Things to avoid including overeating, spicy foods, tight clothing, eating within three hours of bed, this list is not all inclusive. - For treatment of reflux, possible options discussed including diet control, H2 blockers, PPIs, as well as coating motility agents discussed as treatment options. Timing of meals and proximity of last meal to sleep were discussed. If symptoms persist, a formal gastrointestinal evaluation is needed.  Problem 8: Constipation (resolved) -continue Metamucil and prune juice -f/u with PCP  Follow up call made to Krysten berg reviewing careplan  f/u with NP in 6 weeks with NP; Dr. Almaraz 12/2024 pt is encouraged to call office with any questions or concerns.

## 2024-09-27 NOTE — HISTORY OF PRESENT ILLNESS
[TextBox_4] : TODAY'S VISIT 9/27/2024 - ACUTE -COVID 19+  Ms. VERNON is being seen for acute visit with newly dxed COVID 19 infection (1st time) with SOB, ?Anemia, Allergies, PND, Chronic Cough, Mild Asthma.  -reports she was feeling better after Prednisone and Azithromycin  -reports family from [facility] came mid September. Tested positive for COVID 19 on 9/12/2024 confirmed at [facility]. Was prescribed Lagevrio every 12 hours x 5 days and Benzonatate for cough -reports she still feels fever, PND, fatigue.  -reports she has right ear "whistling sound"; tenderness behind bilateral ears -reports itching in throat that causes cough; but cough improved since initially diagnosed -reports stopped taking Advair; denies sob -reports she is taking flonase and azelastine nasal sprays but PND still persists -reports taking tylenol at night -reports constipation improved with Metamucil and prune juice -reports no  longer has issues with urination; drinking lots of water  denies any headaches, nausea, vomiting, fever, chills, sweats, chest pain, chest pressure, palpitations, constipation, dysphagia, dizziness, leg swelling, leg pain, itchy eyes, itchy ears, heartburn, reflux or sour taste in the mouth.   LAST VISIT 8/28/2024- REMAINS ACUTE Ms. VERNON is being seen for for acute visit SOB, ?Anemia, Allergies, PND, Chronic Cough, Mild Asthma. Krysten Phillips present during visit to offer HPI  -reports coughing has improved but still feeling sob, fatigue; s/p prednisone; has been using Advair, nebulizer -reports dysphonia and sore throat -reports nonproductive cough; was unable to produce for sputum culture order placed during last visit -reports has PND; using nasal sprays -reports burning sensation during urination -reports constipation; last BM 2 days ago  denies any headaches, nausea, vomiting, fever, chills, sweats, chest pain, chest pressure, palpitations, dysphagia, dizziness, leg swelling, leg pain, itchy eyes, itchy ears, heartburn, reflux or sour taste in the mouth.  VISIT 8/6/2024- ACUTE VISIT Ms. VERNON is being seen for for Hemoptysis, SOB, ?Anemia, Allergies, PND, Chronic Cough, Mild Asthma.  Dtr, Krysten present during visit to offer HPI  -reports sob, fatigue, sore throat x 9 days; taking tylenol and ricola -reports not using maintenance inhalers -reports she has reflux that is controlled with Pantoprazole and Famotidine -reports has sinus congestion; not using nasal sprays -reports cough- with minimal production  -reports she has osteoporosis; gets Reclast infusions annually.  denies any headaches, nausea, vomiting, chills, sweats, chest pain, chest pressure, palpitations, constipation, dysphagia, dizziness, leg swelling, leg pain, itchy eyes, itchy ears, heartburn, reflux or sour taste in the mouth.

## 2024-09-27 NOTE — PROCEDURE
[FreeTextEntry1] :  VISIT 9/27/2024 CXray read by Dr. Almaraz Hyperinflation calcified cartilage. Bronchiectasis RML/lingula  VISIT 8/6/2024 CXRAY read by Dr. Almaraz Scoliosis calcified cartilage otherwise  normal   VISIT 6/20/2024 Full PFT reveals mild obstructive dysfunction; FEV1 was 1.28 L which is 76 % of predicted, normal lung volumes, normal diffusions, at 12.3 L which is 76% predicted, normal flow volume loop. PFT's for performed to evaluate for SOB.   FENO was 16; a normal value being less than 25. Fractional exhaled nitric oxide (FENO) is regarded as a simple, noninvasive method for assessing eosinophilic airway inflammation. Produced by a variety of cells within the lung, nitric oxide (NO) concentrations are generally low in healthy individuals. However, high concentrations of NO appear to be involved in nonspecific host defense mechanisms and chronic inflammatory diseases such as asthma. The [employer] Thor[employer] Society (ATS) therefore has strongly recommended using FENO to aid in the assessment, management, and long-term monitoring of eosinophilic airway inflammation and asthma, and for identifying steroid responsive individuals whose chronic respiratory symptoms may be caused by airway inflammation. In their 2011 clinical practice guideline, the ATS emphasizes the importance of using FENO.

## 2024-09-28 LAB
CD3 CELLS # BLD: 935 CELLS/UL
CD3 CELLS NFR BLD: 71 %
CD3+CD4+ CELLS # BLD: 532 CELLS/UL
CD3+CD4+ CELLS NFR BLD: 40 %
CD3+CD4+ CELLS/CD3+CD8+ CLL SPEC: 1.31 RATIO
CD3+CD8+ CELLS # SPEC: 408 CELLS/UL
CD3+CD8+ CELLS NFR BLD: 31 %
DEPRECATED KAPPA LC FREE/LAMBDA SER: 1.62 RATIO
IGA SER QL IEP: 72 MG/DL
IGG SER QL IEP: 599 MG/DL
IGG SER QL IEP: 599 MG/DL
IGG1 SER-MCNC: 336 MG/DL
IGG2 SER-MCNC: 151 MG/DL
IGG3 SER-MCNC: 67 MG/DL
IGG4 SER-MCNC: 22.6 MG/DL
IGM SER QL IEP: 88 MG/DL
KAPPA LC CSF-MCNC: 0.87 MG/DL
KAPPA LC SERPL-MCNC: 1.41 MG/DL

## 2024-09-29 LAB — TOTAL IGE SMQN RAST: 41 KU/L

## 2024-09-30 RX ORDER — GLUCOSAMINE HCL 500 MG
75 MCG TABLET ORAL
Qty: 90 | Refills: 0 | Status: ACTIVE | COMMUNITY
Start: 2024-09-30 | End: 1900-01-01

## 2024-09-30 RX ORDER — LEVOCETIRIZINE DIHYDROCHLORIDE 5 MG/1
5 TABLET ORAL
Qty: 1 | Refills: 1 | Status: ACTIVE | COMMUNITY
Start: 2024-09-30 | End: 1900-01-01

## 2024-11-18 ENCOUNTER — APPOINTMENT (OUTPATIENT)
Dept: PULMONOLOGY | Facility: CLINIC | Age: 84
End: 2024-11-18
Payer: MEDICARE

## 2024-11-18 VITALS
DIASTOLIC BLOOD PRESSURE: 78 MMHG | RESPIRATION RATE: 16 BRPM | WEIGHT: 117 LBS | HEART RATE: 67 BPM | OXYGEN SATURATION: 94 % | BODY MASS INDEX: 20.73 KG/M2 | HEIGHT: 63 IN | SYSTOLIC BLOOD PRESSURE: 128 MMHG | TEMPERATURE: 97.8 F

## 2024-11-18 DIAGNOSIS — R09.82 POSTNASAL DRIP: ICD-10-CM

## 2024-11-18 DIAGNOSIS — J30.9 ALLERGIC RHINITIS, UNSPECIFIED: ICD-10-CM

## 2024-11-18 DIAGNOSIS — J45.991 COUGH VARIANT ASTHMA: ICD-10-CM

## 2024-11-18 DIAGNOSIS — R05.3 CHRONIC COUGH: ICD-10-CM

## 2024-11-18 DIAGNOSIS — K21.9 GASTRO-ESOPHAGEAL REFLUX DISEASE W/OUT ESOPHAGITIS: ICD-10-CM

## 2024-11-18 PROCEDURE — 99214 OFFICE O/P EST MOD 30 MIN: CPT

## 2024-11-18 RX ORDER — PREDNISONE 10 MG/1
10 TABLET ORAL
Qty: 21 | Refills: 0 | Status: ACTIVE | COMMUNITY
Start: 2024-11-18 | End: 1900-01-01

## 2024-12-03 ENCOUNTER — APPOINTMENT (OUTPATIENT)
Dept: PEDIATRIC ALLERGY IMMUNOLOGY | Facility: CLINIC | Age: 84
End: 2024-12-03

## 2024-12-19 ENCOUNTER — APPOINTMENT (OUTPATIENT)
Dept: PEDIATRIC ALLERGY IMMUNOLOGY | Facility: CLINIC | Age: 84
End: 2024-12-19

## 2024-12-19 ENCOUNTER — LABORATORY RESULT (OUTPATIENT)
Age: 84
End: 2024-12-19

## 2024-12-19 VITALS
DIASTOLIC BLOOD PRESSURE: 64 MMHG | HEART RATE: 64 BPM | SYSTOLIC BLOOD PRESSURE: 136 MMHG | HEIGHT: 64 IN | OXYGEN SATURATION: 93 %

## 2024-12-19 DIAGNOSIS — R05.3 CHRONIC COUGH: ICD-10-CM

## 2024-12-19 DIAGNOSIS — J45.40 MODERATE PERSISTENT ASTHMA, UNCOMPLICATED: ICD-10-CM

## 2024-12-19 DIAGNOSIS — J45.991 COUGH VARIANT ASTHMA: ICD-10-CM

## 2024-12-19 PROCEDURE — G2211 COMPLEX E/M VISIT ADD ON: CPT

## 2024-12-19 PROCEDURE — 99205 OFFICE O/P NEW HI 60 MIN: CPT | Mod: GC

## 2024-12-19 PROCEDURE — 36415 COLL VENOUS BLD VENIPUNCTURE: CPT

## 2024-12-19 RX ORDER — PNEUMOCOCCAL VACCINE POLYVALENT 25; 25; 25; 25; 25; 25; 25; 25; 25; 25; 25; 25; 25; 25; 25; 25; 25; 25; 25; 25; 25; 25; 25 UG/.5ML; UG/.5ML; UG/.5ML; UG/.5ML; UG/.5ML; UG/.5ML; UG/.5ML; UG/.5ML; UG/.5ML; UG/.5ML; UG/.5ML; UG/.5ML; UG/.5ML; UG/.5ML; UG/.5ML; UG/.5ML; UG/.5ML; UG/.5ML; UG/.5ML; UG/.5ML; UG/.5ML; UG/.5ML; UG/.5ML
25 INJECTION, SOLUTION INTRAMUSCULAR; SUBCUTANEOUS
Qty: 1 | Refills: 0 | Status: DISCONTINUED | COMMUNITY
Start: 2024-12-19 | End: 2024-12-19

## 2024-12-19 RX ORDER — PNEUMOCOCCAL VACCINE POLYVALENT 25; 25; 25; 25; 25; 25; 25; 25; 25; 25; 25; 25; 25; 25; 25; 25; 25; 25; 25; 25; 25; 25; 25 UG/.5ML; UG/.5ML; UG/.5ML; UG/.5ML; UG/.5ML; UG/.5ML; UG/.5ML; UG/.5ML; UG/.5ML; UG/.5ML; UG/.5ML; UG/.5ML; UG/.5ML; UG/.5ML; UG/.5ML; UG/.5ML; UG/.5ML; UG/.5ML; UG/.5ML; UG/.5ML; UG/.5ML; UG/.5ML; UG/.5ML
25 INJECTION, SOLUTION INTRAMUSCULAR; SUBCUTANEOUS
Qty: 1 | Refills: 0 | Status: COMPLETED | COMMUNITY
Start: 2024-12-19 | End: 2024-12-19

## 2024-12-20 ENCOUNTER — APPOINTMENT (OUTPATIENT)
Dept: PULMONOLOGY | Facility: CLINIC | Age: 84
End: 2024-12-20
Payer: MEDICARE

## 2024-12-20 ENCOUNTER — NON-APPOINTMENT (OUTPATIENT)
Age: 84
End: 2024-12-20

## 2024-12-20 VITALS
RESPIRATION RATE: 16 BRPM | BODY MASS INDEX: 21.71 KG/M2 | TEMPERATURE: 95.8 F | WEIGHT: 115 LBS | HEIGHT: 61 IN | OXYGEN SATURATION: 96 % | SYSTOLIC BLOOD PRESSURE: 120 MMHG | DIASTOLIC BLOOD PRESSURE: 64 MMHG | HEART RATE: 71 BPM

## 2024-12-20 DIAGNOSIS — R26.89 OTHER ABNORMALITIES OF GAIT AND MOBILITY: ICD-10-CM

## 2024-12-20 DIAGNOSIS — R06.02 SHORTNESS OF BREATH: ICD-10-CM

## 2024-12-20 DIAGNOSIS — J30.9 ALLERGIC RHINITIS, UNSPECIFIED: ICD-10-CM

## 2024-12-20 DIAGNOSIS — J45.909 UNSPECIFIED ASTHMA, UNCOMPLICATED: ICD-10-CM

## 2024-12-20 DIAGNOSIS — R79.89 OTHER SPECIFIED ABNORMAL FINDINGS OF BLOOD CHEMISTRY: ICD-10-CM

## 2024-12-20 DIAGNOSIS — K21.9 GASTRO-ESOPHAGEAL REFLUX DISEASE W/OUT ESOPHAGITIS: ICD-10-CM

## 2024-12-20 DIAGNOSIS — R76.8 OTHER SPECIFIED ABNORMAL IMMUNOLOGICAL FINDINGS IN SERUM: ICD-10-CM

## 2024-12-20 LAB
DEPRECATED KAPPA LC FREE/LAMBDA SER: 1.47 RATIO
IGA SER QL IEP: 61 MG/DL
IGG SER QL IEP: 535 MG/DL
IGM SER QL IEP: 77 MG/DL
KAPPA LC CSF-MCNC: 0.83 MG/DL
KAPPA LC SERPL-MCNC: 1.22 MG/DL
MEV IGG FLD QL IA: >300 AU/ML
MEV IGG+IGM SER-IMP: POSITIVE
MUV AB SER-ACNC: POSITIVE
MUV IGG SER QL IA: 55.7 AU/ML
RUBV IGG FLD-ACNC: 15.2 INDEX
RUBV IGG SER-IMP: POSITIVE
VZV AB TITR SER: POSITIVE
VZV IGG SER IF-ACNC: 44.6 S/CO

## 2024-12-20 PROCEDURE — ZZZZZ: CPT

## 2024-12-20 PROCEDURE — 99214 OFFICE O/P EST MOD 30 MIN: CPT | Mod: 25

## 2024-12-20 PROCEDURE — 95012 NITRIC OXIDE EXP GAS DETER: CPT

## 2024-12-20 PROCEDURE — 94010 BREATHING CAPACITY TEST: CPT

## 2024-12-23 ENCOUNTER — NON-APPOINTMENT (OUTPATIENT)
Age: 84
End: 2024-12-23

## 2024-12-23 LAB
A ALTERNATA IGE QN: <0.1 KUA/L
A FUMIGATUS IGE QN: <0.1 KUA/L
AMER BEECH IGE QN: 0
BOXELDER IGE QN: <0.1 KUA/L
C LUNATA IGE QN: <0.1 KUA/L
CAT DANDER IGE QN: <0.1 KUA/L
CEDAR IGE QN: <0.1 KUA/L
CMN PIGWEED IGE QN: <0.1 KUA/L
COCKLEBUR IGE QN: <0.1 KUA/L
COMMON RAGWEED IGE QN: <0.1 KUA/L
D FARINAE IGE QN: <0.1 KUA/L
D PTERONYSS IGE QN: <0.1 KUA/L
DEPRECATED A ALTERNATA IGE RAST QL: 0
DEPRECATED A FUMIGATUS IGE RAST QL: 0
DEPRECATED A PULLULANS IGE RAST QL: 0
DEPRECATED AMER BEECH IGE RAST QL: <0.1 KUA/L
DEPRECATED BOXELDER IGE RAST QL: 0
DEPRECATED C LUNATA IGE RAST QL: 0
DEPRECATED CAT DANDER IGE RAST QL: 0
DEPRECATED CEDAR IGE RAST QL: 0
DEPRECATED COCKLEBUR IGE RAST QL: 0
DEPRECATED COMMON PIGWEED IGE RAST QL: 0
DEPRECATED COMMON RAGWEED IGE RAST QL: 0
DEPRECATED D FARINAE IGE RAST QL: 0
DEPRECATED D PTERONYSS IGE RAST QL: 0
DEPRECATED DOG DANDER IGE RAST QL: 0
DEPRECATED F MONILIFORME IGE RAST QL: 0
DEPRECATED GOOSE FEATHER IGE RAST QL: 0
DEPRECATED GOOSEFOOT IGE RAST QL: 0
DEPRECATED KENT BLUE GRASS IGE RAST QL: 0
DEPRECATED LONDON PLANE IGE RAST QL: 0
DEPRECATED M RACEMOSUS IGE RAST QL: 0
DEPRECATED MUGWORT IGE RAST QL: 0
DEPRECATED P NOTATUM IGE RAST QL: 0
DEPRECATED R NIGRICANS IGE RAST QL: 0
DEPRECATED ROACH IGE RAST QL: 0
DEPRECATED SILVER BIRCH IGE RAST QL: 0
DEPRECATED TIMOTHY IGE RAST QL: 0
DEPRECATED WHITE ASH IGE RAST QL: 0
DEPRECATED WHITE HICKORY IGE RAST QL: 0
DEPRECATED WHITE OAK IGE RAST QL: 0
DOG DANDER IGE QN: <0.1 KUA/L
F MONILIFORME IGE QN: <0.1 KUA/L
GOOSE FEATHER IGE QN: <0.1 KUA/L
GOOSEFOOT IGE QN: <0.1 KUA/L
KENT BLUE GRASS IGE QN: <0.1 KUA/L
LONDON PLANE IGE QN: <0.1 KUA/L
M RACEMOSUS IGE QN: <0.1 KUA/L
MOLD (AUREOBASIDIUM M12) CONC: <0.1 KUA/L
MUGWORT IGE QN: <0.1 KUA/L
MULBERRY (T70) CLASS: 0
MULBERRY (T70) CONC: <0.1 KUA/L
P NOTATUM IGE QN: <0.1 KUA/L
R NIGRICANS IGE QN: <0.1 KUA/L
ROACH IGE QN: <0.1 KUA/L
SILVER BIRCH IGE QN: <0.1 KUA/L
TIMOTHY IGE QN: <0.1 KUA/L
WHITE ASH IGE QN: <0.1 KUA/L
WHITE ELM IGE QN: 0
WHITE ELM IGE QN: <0.1 KUA/L
WHITE HICKORY IGE QN: <0.1 KUA/L
WHITE OAK IGE QN: <0.1 KUA/L

## 2024-12-26 ENCOUNTER — NON-APPOINTMENT (OUTPATIENT)
Age: 84
End: 2024-12-26

## 2024-12-26 LAB
C DIPHTHERIAE AB SER QL: 0.15 IU/ML
C TETANI IGG SER-ACNC: <0.1 IU/ML

## 2024-12-27 PROBLEM — J45.40 MODERATE PERSISTENT ASTHMA WITHOUT COMPLICATION: Status: ACTIVE | Noted: 2024-12-27

## 2025-01-03 ENCOUNTER — NON-APPOINTMENT (OUTPATIENT)
Age: 85
End: 2025-01-03

## 2025-01-15 PROBLEM — N64.89 HEMATOMA OF BREAST: Status: ACTIVE | Noted: 2025-01-15

## 2025-01-16 ENCOUNTER — APPOINTMENT (OUTPATIENT)
Dept: SURGICAL ONCOLOGY | Facility: CLINIC | Age: 85
End: 2025-01-16
Payer: MEDICARE

## 2025-01-16 DIAGNOSIS — N64.89 OTHER SPECIFIED DISORDERS OF BREAST: ICD-10-CM

## 2025-01-16 PROCEDURE — G2212 PROLONG OUTPT/OFFICE VIS: CPT

## 2025-01-16 PROCEDURE — 99205 OFFICE O/P NEW HI 60 MIN: CPT

## 2025-04-07 ENCOUNTER — APPOINTMENT (OUTPATIENT)
Dept: GASTROENTEROLOGY | Facility: CLINIC | Age: 85
End: 2025-04-07

## 2025-04-11 ENCOUNTER — APPOINTMENT (OUTPATIENT)
Dept: CT IMAGING | Facility: IMAGING CENTER | Age: 85
End: 2025-04-11

## 2025-04-18 ENCOUNTER — APPOINTMENT (OUTPATIENT)
Dept: CT IMAGING | Facility: IMAGING CENTER | Age: 85
End: 2025-04-18

## 2025-06-14 ENCOUNTER — APPOINTMENT (OUTPATIENT)
Dept: CT IMAGING | Facility: IMAGING CENTER | Age: 85
End: 2025-06-14

## 2025-06-17 ENCOUNTER — APPOINTMENT (OUTPATIENT)
Dept: ORTHOPEDIC SURGERY | Facility: CLINIC | Age: 85
End: 2025-06-17

## 2025-06-18 ENCOUNTER — APPOINTMENT (OUTPATIENT)
Dept: MRI IMAGING | Facility: IMAGING CENTER | Age: 85
End: 2025-06-18

## 2025-06-18 ENCOUNTER — OUTPATIENT (OUTPATIENT)
Dept: OUTPATIENT SERVICES | Facility: HOSPITAL | Age: 85
LOS: 1 days | End: 2025-06-18
Payer: MEDICARE

## 2025-06-18 DIAGNOSIS — R16.0 HEPATOMEGALY, NOT ELSEWHERE CLASSIFIED: ICD-10-CM

## 2025-06-18 PROCEDURE — A9585: CPT

## 2025-06-18 PROCEDURE — 74183 MRI ABD W/O CNTR FLWD CNTR: CPT | Mod: MH

## 2025-06-18 PROCEDURE — 74183 MRI ABD W/O CNTR FLWD CNTR: CPT | Mod: 26

## 2025-06-27 ENCOUNTER — APPOINTMENT (OUTPATIENT)
Dept: PULMONOLOGY | Facility: CLINIC | Age: 85
End: 2025-06-27

## 2025-07-16 ENCOUNTER — APPOINTMENT (OUTPATIENT)
Dept: PULMONOLOGY | Facility: CLINIC | Age: 85
End: 2025-07-16